# Patient Record
Sex: FEMALE | Race: BLACK OR AFRICAN AMERICAN | NOT HISPANIC OR LATINO | Employment: FULL TIME | URBAN - METROPOLITAN AREA
[De-identification: names, ages, dates, MRNs, and addresses within clinical notes are randomized per-mention and may not be internally consistent; named-entity substitution may affect disease eponyms.]

---

## 2017-01-13 DIAGNOSIS — Z00.00 ROUTINE GENERAL MEDICAL EXAMINATION AT A HEALTH CARE FACILITY: Primary | ICD-10-CM

## 2017-01-13 DIAGNOSIS — R73.03 PREDIABETES: ICD-10-CM

## 2017-02-13 ENCOUNTER — LAB VISIT (OUTPATIENT)
Dept: LAB | Facility: HOSPITAL | Age: 37
End: 2017-02-13
Attending: FAMILY MEDICINE
Payer: COMMERCIAL

## 2017-02-13 DIAGNOSIS — Z00.00 ROUTINE GENERAL MEDICAL EXAMINATION AT A HEALTH CARE FACILITY: ICD-10-CM

## 2017-02-13 DIAGNOSIS — R73.03 PREDIABETES: ICD-10-CM

## 2017-02-13 LAB
ALBUMIN SERPL BCP-MCNC: 3.5 G/DL
ALP SERPL-CCNC: 70 U/L
ALT SERPL W/O P-5'-P-CCNC: 13 U/L
ANION GAP SERPL CALC-SCNC: 9 MMOL/L
AST SERPL-CCNC: 21 U/L
BASOPHILS # BLD AUTO: 0.02 K/UL
BASOPHILS NFR BLD: 0.3 %
BILIRUB SERPL-MCNC: 0.3 MG/DL
BUN SERPL-MCNC: 12 MG/DL
CALCIUM SERPL-MCNC: 9.3 MG/DL
CHLORIDE SERPL-SCNC: 106 MMOL/L
CHOLEST/HDLC SERPL: 2.9 {RATIO}
CO2 SERPL-SCNC: 24 MMOL/L
CREAT SERPL-MCNC: 0.8 MG/DL
DIFFERENTIAL METHOD: ABNORMAL
EOSINOPHIL # BLD AUTO: 0.1 K/UL
EOSINOPHIL NFR BLD: 1.2 %
ERYTHROCYTE [DISTWIDTH] IN BLOOD BY AUTOMATED COUNT: 15 %
EST. GFR  (AFRICAN AMERICAN): >60 ML/MIN/1.73 M^2
EST. GFR  (NON AFRICAN AMERICAN): >60 ML/MIN/1.73 M^2
GLUCOSE SERPL-MCNC: 93 MG/DL
HCT VFR BLD AUTO: 39.5 %
HDL/CHOLESTEROL RATIO: 34 %
HDLC SERPL-MCNC: 159 MG/DL
HDLC SERPL-MCNC: 54 MG/DL
HGB BLD-MCNC: 12.5 G/DL
LDLC SERPL CALC-MCNC: 86 MG/DL
LYMPHOCYTES # BLD AUTO: 1.7 K/UL
LYMPHOCYTES NFR BLD: 25.9 %
MCH RBC QN AUTO: 25 PG
MCHC RBC AUTO-ENTMCNC: 31.6 %
MCV RBC AUTO: 79 FL
MONOCYTES # BLD AUTO: 0.5 K/UL
MONOCYTES NFR BLD: 7.4 %
NEUTROPHILS # BLD AUTO: 4.2 K/UL
NEUTROPHILS NFR BLD: 65 %
NONHDLC SERPL-MCNC: 105 MG/DL
PLATELET # BLD AUTO: 261 K/UL
PMV BLD AUTO: 11.5 FL
POTASSIUM SERPL-SCNC: 4.2 MMOL/L
PROT SERPL-MCNC: 7.9 G/DL
RBC # BLD AUTO: 5 M/UL
SODIUM SERPL-SCNC: 139 MMOL/L
TRIGL SERPL-MCNC: 95 MG/DL
TSH SERPL DL<=0.005 MIU/L-ACNC: 2.27 UIU/ML
WBC # BLD AUTO: 6.48 K/UL

## 2017-02-13 PROCEDURE — 80053 COMPREHEN METABOLIC PANEL: CPT

## 2017-02-13 PROCEDURE — 80061 LIPID PANEL: CPT

## 2017-02-13 PROCEDURE — 84443 ASSAY THYROID STIM HORMONE: CPT

## 2017-02-13 PROCEDURE — 85025 COMPLETE CBC W/AUTO DIFF WBC: CPT

## 2017-02-13 PROCEDURE — 83036 HEMOGLOBIN GLYCOSYLATED A1C: CPT

## 2017-02-13 PROCEDURE — 36415 COLL VENOUS BLD VENIPUNCTURE: CPT | Mod: PO

## 2017-02-14 LAB
ESTIMATED AVG GLUCOSE: 114 MG/DL
HBA1C MFR BLD HPLC: 5.6 %

## 2017-02-15 ENCOUNTER — LAB VISIT (OUTPATIENT)
Dept: LAB | Facility: HOSPITAL | Age: 37
End: 2017-02-15
Attending: FAMILY MEDICINE
Payer: COMMERCIAL

## 2017-02-15 ENCOUNTER — OFFICE VISIT (OUTPATIENT)
Dept: FAMILY MEDICINE | Facility: CLINIC | Age: 37
End: 2017-02-15
Payer: COMMERCIAL

## 2017-02-15 VITALS
WEIGHT: 216.5 LBS | HEART RATE: 72 BPM | HEIGHT: 62 IN | DIASTOLIC BLOOD PRESSURE: 76 MMHG | BODY MASS INDEX: 39.84 KG/M2 | SYSTOLIC BLOOD PRESSURE: 118 MMHG | TEMPERATURE: 99 F

## 2017-02-15 DIAGNOSIS — R53.83 FATIGUE, UNSPECIFIED TYPE: ICD-10-CM

## 2017-02-15 DIAGNOSIS — R73.03 PREDIABETES: ICD-10-CM

## 2017-02-15 DIAGNOSIS — E03.4 HYPOTHYROIDISM DUE TO ACQUIRED ATROPHY OF THYROID: ICD-10-CM

## 2017-02-15 DIAGNOSIS — E66.01 MORBID OBESITY WITH BMI OF 40.0-44.9, ADULT: ICD-10-CM

## 2017-02-15 DIAGNOSIS — L83 ACANTHOSIS NIGRICANS: ICD-10-CM

## 2017-02-15 DIAGNOSIS — Z00.00 ROUTINE GENERAL MEDICAL EXAMINATION AT A HEALTH CARE FACILITY: Primary | ICD-10-CM

## 2017-02-15 DIAGNOSIS — E55.9 VITAMIN D DEFICIENCY DISEASE: ICD-10-CM

## 2017-02-15 DIAGNOSIS — R01.1 HEART MURMUR: ICD-10-CM

## 2017-02-15 LAB
25(OH)D3+25(OH)D2 SERPL-MCNC: 28 NG/ML
CRP SERPL-MCNC: 1.3 MG/L
ERYTHROCYTE [SEDIMENTATION RATE] IN BLOOD BY WESTERGREN METHOD: 34 MM/HR
T3FREE SERPL-MCNC: 3.8 PG/ML
T4 FREE SERPL-MCNC: 0.81 NG/DL

## 2017-02-15 PROCEDURE — 99395 PREV VISIT EST AGE 18-39: CPT | Mod: S$GLB,,, | Performed by: FAMILY MEDICINE

## 2017-02-15 PROCEDURE — 99999 PR PBB SHADOW E&M-EST. PATIENT-LVL III: CPT | Mod: PBBFAC,,, | Performed by: FAMILY MEDICINE

## 2017-02-15 PROCEDURE — 85651 RBC SED RATE NONAUTOMATED: CPT

## 2017-02-15 PROCEDURE — 86140 C-REACTIVE PROTEIN: CPT

## 2017-02-15 PROCEDURE — 82306 VITAMIN D 25 HYDROXY: CPT

## 2017-02-15 PROCEDURE — 84481 FREE ASSAY (FT-3): CPT

## 2017-02-15 PROCEDURE — 36415 COLL VENOUS BLD VENIPUNCTURE: CPT | Mod: PO

## 2017-02-15 PROCEDURE — 84439 ASSAY OF FREE THYROXINE: CPT

## 2017-02-15 NOTE — MR AVS SNAPSHOT
[facility]  [address] [facility], [address]  [facility] [address]  Phone: [phone]  Fax: [phone]                  Katiuska Salazar   2/15/2017 3:00 PM   Office Visit    Description:  Female : 1980   Provider:  Geovanna Miller DO   Department:  [facility]           Reason for Visit     Annual Exam           Diagnoses this Visit        Comments    Routine general medical examination at a health care facility    -  Primary     Fatigue, unspecified type         Hypothyroidism due to acquired atrophy of thyroid         Vitamin D deficiency disease         Acanthosis nigricans         Morbid obesity with BMI of 40.0-44.9, adult         Prediabetes         Heart murmur                To Do List           Goals (5 Years of Data)     None      [facility] On [facility]     [facility] On Call Nurse Care Line -  Assistance  Registered nurses in the [facility] On Call Center provide clinical advisement, health education, appointment booking, and other advisory services.  Call for this free service at [phone].             Medications           Message regarding Medications     Verify the changes and/or additions to your medication regime listed below are the same as discussed with your clinician today.  If any of these changes or additions are incorrect, please notify your healthcare provider.        STOP taking these medications     PRENATAL VITAMINS-IRON-FA ORAL Take 1 tablet by mouth once daily.      docusate sodium (COLACE) 100 MG capsule Take 1 capsule (100 mg total) by mouth 2 (two) times daily as needed for Constipation.    hydrocodone-acetaminophen 5-325mg (NORCO) 5-325 mg per tablet Take 1 tablet by mouth every 6 (six) hours as needed for Pain.    hydrocortisone 2.5 % cream     simethicone (MYLICON) 80 MG chewable tablet Take 1 tablet (80 mg total) by mouth every 6 (six) hours as needed for Flatulence.    naproxen sodium (ANAPROX) 550 MG tablet Take 1 tablet  "(550 mg total) by mouth every 12 (twelve) hours as needed (cramping/mild pain).           Verify that the below list of medications is an accurate representation of the medications you are currently taking.  If none reported, the list may be blank. If incorrect, please contact your healthcare provider. Carry this list with you in case of emergency.           Current Medications     ARMOUR THYROID 240 mg Tab Take 1 tablet by mouth every evening.     ORTHO TRI-CYCLEN, 28, 0.18/0.215/0.25 mg-35 mcg (28) tablet TAKE 1 TABLET BY MOUTH ONCE DAILY.    iron polysaccharides (NIFEREX) 150 mg iron Cap Take 1 capsule (150 mg total) by mouth once daily.           Clinical Reference Information           Your Vitals Were     BP Pulse Temp Height Weight Last Period    118/76 (BP Location: Left arm, Patient Position: Sitting, BP Method: Manual) 72 98.5 °F (36.9 °C) (Oral) 5' 1.5" (1.562 m) 98.2 kg (216 lb 7.9 oz) 01/25/2017    BMI                40.24 kg/m2          Blood Pressure          Most Recent Value    BP  118/76      Allergies as of 2/15/2017     No Known Allergies      Immunizations Administered on Date of Encounter - 2/15/2017     None      Orders Placed During Today's Visit     Future Labs/Procedures Expected by Expires    C-reactive protein  2/15/2017 4/16/2018    Sedimentation rate, manual  2/15/2017 4/16/2018    T3, free  2/15/2017 4/16/2018    T4, free  2/15/2017 4/16/2018    Vitamin D  2/15/2017 4/16/2018    2D Echo w/ Color Flow Doppler  As directed 2/15/2018      Language Assistance Services     ATTENTION: Language assistance services are available, free of charge. Please call [phone].      ATENCIÓN: Si habla español, tiene a dunlap disposición servicios gratuitos de asistencia lingüística. Llame al [phone].     [facility] Ý: N?u b?n nói Ti?ng Vi?t, có các d?ch v? h? tr? ngôn ng? mi?n phí dành cho b?n. G?i s? [phone].         [facility] complies with applicable Federal civil rights laws " and does not discriminate on the basis of race, color, national origin, age, disability, or sex.

## 2017-02-15 NOTE — PROGRESS NOTES
Subjective:       Patient ID: Katiuska Slaazar is a 36 y.o. female.    Chief Complaint: Annual Exam    HPI  Review of Systems   Constitutional: Positive for fatigue (for past month - and worse in past 2 weeks). Negative for appetite change and fever.   HENT: Negative for hearing loss and sore throat.    Eyes: Negative.    Respiratory: Negative for cough, chest tightness, shortness of breath and wheezing.    Cardiovascular: Negative for chest pain, palpitations and leg swelling.   Gastrointestinal: Negative for abdominal pain, blood in stool, constipation, diarrhea, nausea and vomiting.   Endocrine: Negative.    Genitourinary: Negative for difficulty urinating, dysuria, frequency, hematuria, menstrual problem, pelvic pain and urgency.        Moderately heavy but no change from her normal. Last 4 days .   Musculoskeletal: Negative for back pain.        Pain in the heels- especially first thing in the morning or if she has been sitting for a while and first gets up an and walks    Skin: Negative for pallor and rash.   Allergic/Immunologic: Negative.    Neurological: Negative for dizziness, syncope, light-headedness and headaches.   Hematological: Negative for adenopathy.   Psychiatric/Behavioral: Negative.  Negative for dysphoric mood and sleep disturbance.       Objective:      Physical Exam   Constitutional: She is oriented to person, place, and time. She appears well-developed and well-nourished.   HENT:   Head: Normocephalic and atraumatic.   Right Ear: External ear normal.   Left Ear: External ear normal.   Nose: Nose normal.   Mouth/Throat: Oropharynx is clear and moist.   Eyes: Conjunctivae and EOM are normal. Pupils are equal, round, and reactive to light.   Neck: Normal range of motion. Neck supple. No JVD present. No thyromegaly present.   Cardiovascular: Normal rate, regular rhythm, normal heart sounds and intact distal pulses.    No murmur heard.  Pulmonary/Chest: Effort normal and breath sounds  normal.   Abdominal: Soft. Bowel sounds are normal. She exhibits no mass. There is no tenderness.   Musculoskeletal: Normal range of motion. She exhibits no edema.   Lymphadenopathy:     She has no cervical adenopathy.   Neurological: She is alert and oriented to person, place, and time. She has normal reflexes.   Skin: Skin is warm and dry.   Mild acanthosis nigricans   Psychiatric: She has a normal mood and affect. Her behavior is normal. Judgment and thought content normal.   Vitals reviewed.      HEALTH SCREENING  Mammogram na  Pap 7/13 due   BMD- na  Colonoscopy- na  Pneumovax na  prevnar- na  fluvax- recommended  Shingles- na  Tdap-8/15  AD/Living Will- copies provided    Assessment:         Katiuska was seen today for annual exam.    Diagnoses and all orders for this visit:    Routine general medical examination at a health care facility    Fatigue, unspecified type  -     Sedimentation rate, manual; Future  -     C-reactive protein; Future    Hypothyroidism due to acquired atrophy of thyroid  -     T3, free; Future  -     T4, free; Future    Vitamin D deficiency disease  -     Vitamin D; Future    Acanthosis nigricans  Encourage low carb diet and regular exercise    Morbid obesity with BMI of 40.0-44.9, adult  Encourage low carb diet and regular exercise    Prediabetes  Encourage low carb diet and regular exercise    Heart murmur  -     2D Echo w/ Color Flow Doppler; Future  Pt believes this may have something to do with her lifestyle. She is working and doing most of the cleaning and looking after her twins and is not exercising and taking care of herself. She is going to try and change things up a bit but   If echo ok and labs ok but she is not feeling better in a month or two then notify me again

## 2017-02-24 ENCOUNTER — HOSPITAL ENCOUNTER (OUTPATIENT)
Dept: CARDIOLOGY | Facility: HOSPITAL | Age: 37
Discharge: HOME | End: 2017-02-24
Attending: FAMILY MEDICINE
Payer: COMMERCIAL

## 2017-02-24 DIAGNOSIS — R01.1 HEART MURMUR: ICD-10-CM

## 2017-02-24 LAB
DIASTOLIC DYSFUNCTION: NO
ESTIMATED PA SYSTOLIC PRESSURE: 28.07
GLOBAL PERICARDIAL EFFUSION: NORMAL
MITRAL VALVE REGURGITATION: NORMAL
RETIRED EF AND QEF - SEE NOTES: 50 (ref 55–65)
TRICUSPID VALVE REGURGITATION: NORMAL

## 2017-02-24 PROCEDURE — 93306 TTE W/DOPPLER COMPLETE: CPT

## 2017-02-24 PROCEDURE — 93306 TTE W/DOPPLER COMPLETE: CPT | Mod: 26,,, | Performed by: INTERNAL MEDICINE

## 2017-02-25 DIAGNOSIS — R93.1 ABNORMAL ECHOCARDIOGRAM: Primary | ICD-10-CM

## 2017-03-02 ENCOUNTER — LAB VISIT (OUTPATIENT)
Dept: LAB | Facility: HOSPITAL | Age: 37
End: 2017-03-02
Attending: FAMILY MEDICINE
Payer: COMMERCIAL

## 2017-03-02 DIAGNOSIS — Z30.011 ENCOUNTER FOR INITIAL PRESCRIPTION OF CONTRACEPTIVE PILLS: ICD-10-CM

## 2017-03-02 DIAGNOSIS — R93.1 ABNORMAL ECHOCARDIOGRAM: ICD-10-CM

## 2017-03-02 PROBLEM — I34.0 NON-RHEUMATIC MITRAL REGURGITATION: Status: ACTIVE | Noted: 2017-03-02

## 2017-03-02 LAB — BNP SERPL-MCNC: 21 PG/ML

## 2017-03-02 PROCEDURE — 36415 COLL VENOUS BLD VENIPUNCTURE: CPT | Mod: PO

## 2017-03-02 PROCEDURE — 83880 ASSAY OF NATRIURETIC PEPTIDE: CPT

## 2017-03-02 RX ORDER — NORGESTIMATE AND ETHINYL ESTRADIOL 7DAYSX3 28
KIT ORAL
Qty: 28 TABLET | Refills: 1 | Status: SHIPPED | OUTPATIENT
Start: 2017-03-02 | End: 2017-04-27 | Stop reason: SDUPTHER

## 2017-03-14 ENCOUNTER — OFFICE VISIT (OUTPATIENT)
Dept: FAMILY MEDICINE | Facility: CLINIC | Age: 37
End: 2017-03-14
Payer: COMMERCIAL

## 2017-03-14 VITALS
BODY MASS INDEX: 40.63 KG/M2 | TEMPERATURE: 98 F | HEART RATE: 76 BPM | WEIGHT: 215.19 LBS | HEIGHT: 61 IN | DIASTOLIC BLOOD PRESSURE: 70 MMHG | SYSTOLIC BLOOD PRESSURE: 118 MMHG

## 2017-03-14 DIAGNOSIS — I34.0 NON-RHEUMATIC MITRAL REGURGITATION: ICD-10-CM

## 2017-03-14 DIAGNOSIS — H01.139 EYELID DERMATITIS, ECZEMATOUS, UNSPECIFIED LATERALITY: ICD-10-CM

## 2017-03-14 DIAGNOSIS — E55.9 VITAMIN D DEFICIENCY: ICD-10-CM

## 2017-03-14 DIAGNOSIS — E61.1 IRON DEFICIENCY: Primary | ICD-10-CM

## 2017-03-14 DIAGNOSIS — R73.03 PREDIABETES: ICD-10-CM

## 2017-03-14 DIAGNOSIS — E03.4 HYPOTHYROIDISM DUE TO ACQUIRED ATROPHY OF THYROID: ICD-10-CM

## 2017-03-14 DIAGNOSIS — E66.01 MORBID OBESITY WITH BMI OF 40.0-44.9, ADULT: ICD-10-CM

## 2017-03-14 DIAGNOSIS — L83 ACANTHOSIS NIGRICANS: ICD-10-CM

## 2017-03-14 PROCEDURE — 99214 OFFICE O/P EST MOD 30 MIN: CPT | Mod: S$GLB,,, | Performed by: FAMILY MEDICINE

## 2017-03-14 PROCEDURE — 1160F RVW MEDS BY RX/DR IN RCRD: CPT | Mod: S$GLB,,, | Performed by: FAMILY MEDICINE

## 2017-03-14 PROCEDURE — 99999 PR PBB SHADOW E&M-EST. PATIENT-LVL III: CPT | Mod: PBBFAC,,, | Performed by: FAMILY MEDICINE

## 2017-03-14 RX ORDER — TACROLIMUS 1 MG/G
OINTMENT TOPICAL 2 TIMES DAILY
Qty: 30 G | Refills: 1 | Status: SHIPPED | OUTPATIENT
Start: 2017-03-14 | End: 2017-04-28

## 2017-03-14 NOTE — MR AVS SNAPSHOT
[facility]  [address] [facility], [address]  [facility] [address]  Phone: [phone]  Fax: [phone]                  Katiuska Salazar   3/14/2017 11:00 AM   Office Visit    Description:  Female : 1980   Provider:  Geovanna Miller DO   Department:  [facility]           Reason for Visit     Heart Murmur           Diagnoses this Visit        Comments    Iron deficiency    -  Primary     Vitamin D deficiency         Eyelid dermatitis, eczematous, unspecified laterality                To Do List           Future Appointments        Provider Department Dept Phone    2017 9:45 AM Tanya Welch MD [facility] [place] - OB/GYN [address] [phone]      Goals (5 Years of Data)     None       These Medications        Disp Refills Start End    tacrolimus (PROTOPIC) 0.1 % ointment 30 g 1 3/14/2017     Apply topically 2 (two) times daily. - Topical (Top)    Pharmacy: [facility]/pharmacy #[id number] - [place] - [address] Ph #: [phone]         [facility] On Call     [facility] On Call Nurse Care Line -  Assistance  Registered nurses in the [facility] On Call Center provide clinical advisement, health education, appointment booking, and other advisory services.  Call for this free service at [phone].             Medications           Message regarding Medications     Verify the changes and/or additions to your medication regime listed below are the same as discussed with your clinician today.  If any of these changes or additions are incorrect, please notify your healthcare provider.        START taking these NEW medications        Refills    tacrolimus (PROTOPIC) 0.1 % ointment 1    Sig: Apply topically 2 (two) times daily.    Class: Normal    Route: Topical (Top)           Verify that the below list of medications is an accurate representation of the medications you are currently taking.  If none reported, the list may be blank. If incorrect, please  "contact your healthcare provider. Carry this list with you in case of emergency.           Current Medications     ARMOUR THYROID 240 mg Tab Take 1 tablet by mouth every evening.     ORTHO TRI-CYCLEN, 28, 0.18/0.215/0.25 mg-35 mcg (28) tablet TAKE 1 TABLET BY MOUTH ONCE DAILY.    iron polysaccharides (NIFEREX) 150 mg iron Cap Take 1 capsule (150 mg total) by mouth once daily.    tacrolimus (PROTOPIC) 0.1 % ointment Apply topically 2 (two) times daily.           Clinical Reference Information           Your Vitals Were     BP Pulse Temp Height    118/70 (BP Location: Left arm, Patient Position: Sitting, BP Method: Manual) 76 97.9 °F (36.6 °C) (Oral) 5' 1" (1.549 m)    Weight Last Period BMI    97.6 kg (215 lb 2.7 oz) 02/28/2017 (Approximate) 40.66 kg/m2      Blood Pressure          Most Recent Value    BP  118/70      Allergies as of 3/14/2017     No Known Allergies      Immunizations Administered on Date of Encounter - 3/14/2017     None      Orders Placed During Today's Visit     Future Labs/Procedures Expected by Expires    CBC auto differential  3/14/2017 5/13/2018    Iron and TIBC  3/14/2017 3/14/2018    Vitamin D  3/14/2017 5/13/2018      Language Assistance Services     ATTENTION: Language assistance services are available, free of charge. Please call [phone].      ATENCIÓN: Si habla español, tiene a dunlap disposición servicios gratuitos de asistencia lingüística. Llame al [phone].     [facility] Ý: N?u b?n nói Ti?ng Vi?t, có các d?ch v? h? tr? ngôn ng? mi?n phí dành cho b?n. G?i s? [phone].         [facility] complies with applicable Federal civil rights laws and does not discriminate on the basis of race, color, national origin, age, disability, or sex.        "

## 2017-03-14 NOTE — PROGRESS NOTES
Subjective:       Patient ID: Katiuska Salazar is a 36 y.o. female.    Chief Complaint: Heart Murmur (follow up)    HPI she is feeling a bit better overall. She went to the remedy room and had some treatments of high dose vitamin D and is feeling  Better.   50,000 IU shot then a pack of Vit d . So 3 times a week she takes 50,000 units orally for 3 weeks,   Review of Systems  scaling and swelling around her eyelid margins s since xmas- she saw an dermatologist   Objective:      Physical Exam   Constitutional: She is oriented to person, place, and time. She appears well-developed and well-nourished.   HENT:   Head: Normocephalic and atraumatic.   Right Ear: External ear normal.   Left Ear: External ear normal.   Nose: Nose normal.   Mouth/Throat: Oropharynx is clear and moist.   Eyes: Conjunctivae and EOM are normal. Pupils are equal, round, and reactive to light.   Neck: Normal range of motion. Neck supple. No JVD present. No thyromegaly present.   Cardiovascular: Normal rate, regular rhythm, normal heart sounds and intact distal pulses.    No murmur heard.  Pulmonary/Chest: Effort normal and breath sounds normal.   Abdominal: She exhibits no mass. There is no tenderness.   Musculoskeletal: Normal range of motion. She exhibits no edema.   Lymphadenopathy:     She has no cervical adenopathy.   Neurological: She is alert and oriented to person, place, and time. She has normal reflexes.   Skin: Skin is warm and dry. There is erythema (dry and red eyelids).   Psychiatric: She has a normal mood and affect. Her behavior is normal. Judgment and thought content normal.   Vitals reviewed.      Assessment:         Katiuska was seen today for heart murmur.    Diagnoses and all orders for this visit:    Iron deficiency  -     CBC auto differential; Future  -     Iron and TIBC; Future    Vitamin D deficiency  -     Vitamin D; Future    Eyelid dermatitis, eczematous, unspecified laterality  -     tacrolimus (PROTOPIC) 0.1 %  ointment; Apply topically 2 (two) times daily.    Prediabetes  Encourage low carb diet and regular exercise    Morbid obesity with BMI of 40.0-44.9, adult  Encourage low carb diet and regular exercise    Hypothyroidism due to acquired atrophy of thyroid  Continuing current management.  Acanthosis nigricans  Encourage low carb diet and regular exercise    Non-rheumatic mitral regurgitation  Continue diet and exercise and we will repeat us in 1 year

## 2017-04-27 DIAGNOSIS — Z30.011 ENCOUNTER FOR INITIAL PRESCRIPTION OF CONTRACEPTIVE PILLS: ICD-10-CM

## 2017-04-27 RX ORDER — NORGESTIMATE AND ETHINYL ESTRADIOL 7DAYSX3 28
KIT ORAL
Qty: 28 TABLET | Refills: 1 | Status: SHIPPED | OUTPATIENT
Start: 2017-04-27 | End: 2017-04-28 | Stop reason: SDUPTHER

## 2017-04-28 ENCOUNTER — OFFICE VISIT (OUTPATIENT)
Dept: OBSTETRICS AND GYNECOLOGY | Facility: CLINIC | Age: 37
End: 2017-04-28
Attending: OBSTETRICS & GYNECOLOGY
Payer: COMMERCIAL

## 2017-04-28 VITALS
DIASTOLIC BLOOD PRESSURE: 70 MMHG | HEIGHT: 61 IN | BODY MASS INDEX: 40.58 KG/M2 | SYSTOLIC BLOOD PRESSURE: 116 MMHG | WEIGHT: 214.94 LBS

## 2017-04-28 DIAGNOSIS — Z01.419 VISIT FOR GYNECOLOGIC EXAMINATION: Primary | ICD-10-CM

## 2017-04-28 DIAGNOSIS — Z30.011 ENCOUNTER FOR INITIAL PRESCRIPTION OF CONTRACEPTIVE PILLS: ICD-10-CM

## 2017-04-28 PROCEDURE — 88175 CYTOPATH C/V AUTO FLUID REDO: CPT

## 2017-04-28 PROCEDURE — 99395 PREV VISIT EST AGE 18-39: CPT | Mod: S$GLB,,, | Performed by: OBSTETRICS & GYNECOLOGY

## 2017-04-28 PROCEDURE — 99999 PR PBB SHADOW E&M-EST. PATIENT-LVL II: CPT | Mod: PBBFAC,,, | Performed by: OBSTETRICS & GYNECOLOGY

## 2017-04-28 PROCEDURE — 87624 HPV HI-RISK TYP POOLED RSLT: CPT

## 2017-04-28 RX ORDER — NORGESTIMATE AND ETHINYL ESTRADIOL 7DAYSX3 28
1 KIT ORAL DAILY
Qty: 28 TABLET | Refills: 12 | Status: SHIPPED | OUTPATIENT
Start: 2017-04-28 | End: 2018-05-20 | Stop reason: SDUPTHER

## 2017-04-28 NOTE — PROGRESS NOTES
"CC: Well woman exam    Katiuska Salazar is a 36 y.o. female  presents for a well woman exam.  She has no issues, problems, or complaints.    Past Medical History:   Diagnosis Date    Abnormal Pap smear 5 yrs ago    repap    Anemia     History of blood transfusion     Thyroid disease     /hx of GILLILAND tx at age 22       Past Surgical History:   Procedure Laterality Date     SECTION  2015       OB History    Para Term  AB SAB TAB Ectopic Multiple Living   2 1 1  1  1  1 2      # Outcome Date GA Lbr Regan/2nd Weight Sex Delivery Anes PTL Lv   2A Term 09/25/15 37w1d  2.03 kg (4 lb 7.6 oz) F CS-LTranv Spinal N Y   2B Term 09/25/15 37w1d  2.915 kg (6 lb 6.8 oz) M CS-LTranv Spinal N Y   1 TAB                   Family History   Problem Relation Age of Onset    Thyroid disease Mother     Hypertension Mother     No Known Problems Father     No Known Problems Brother     No Known Problems Brother     Lung cancer Maternal Grandmother     Pancreatic cancer Maternal Aunt     No Known Problems Son     No Known Problems Daughter     Sayre legs Neg Hx     Breast cancer Neg Hx     Ovarian cancer Neg Hx     Colon cancer Neg Hx        Social History   Substance Use Topics    Smoking status: Never Smoker    Smokeless tobacco: Never Used    Alcohol use Yes      Comment: socially 0-3 per week       /70  Ht 5' 1" (1.549 m)  Wt 97.5 kg (214 lb 15.2 oz)  LMP 2017 (Exact Date)  BMI 40.61 kg/m2    ROS:  GENERAL: Denies weight gain or weight loss. Feeling well overall.   SKIN: Denies rash or lesions.   HEAD: Denies head injury or headache.   NODES: Denies enlarged lymph nodes.   CHEST: Denies chest pain or shortness of breath.   CARDIOVASCULAR: Denies palpitations or left sided chest pain.   ABDOMEN: No abdominal pain, constipation, diarrhea, nausea, vomiting or rectal bleeding.   URINARY: No frequency, dysuria, hematuria, or burning on urination.  REPRODUCTIVE: See HPI. "   BREASTS: The patient performs breast self-examination and denies pain, lumps, or nipple discharge.   HEMATOLOGIC: No easy bruisability or excessive bleeding.  MUSCULOSKELETAL: Denies joint pain or swelling.   NEUROLOGIC: Denies syncope or weakness.   PSYCHIATRIC: Denies depression, anxiety or mood swings.    Physical Exam:    APPEARANCE: Well nourished, well developed, in no acute distress.  AFFECT: WNL, alert and oriented x 3  SKIN: No acne or hirsutism  NECK: Neck symmetric without masses or thyromegaly  NODES: No inguinal, cervical, axillary, or femoral lymph node enlargement  CHEST: Good respiratory effect  ABDOMEN: Soft.  No tenderness or masses.  No hepatosplenomegaly.  No hernias.  BREASTS: Symmetrical, no skin changes or visible lesions.  No palpable masses, nipple discharge bilaterally.  PELVIC: Normal external genitalia without lesions.  Normal hair distribution.  Adequate perineal body, normal urethral meatus.  Vagina moist and well rugated without lesions or discharge.  Cervix pink, without lesions, discharge or tenderness.  No significant cystocele or rectocele.  Bimanual exam shows uterus to be normal size, regular, mobile and nontender.  Adnexa without masses or tenderness.    EXTREMITIES: No edema.    ASSESSMENT AND PLAN  1. Visit for gynecologic examination  Liquid-based pap smear, screening    HPV High Risk Genotypes, PCR   2. Encounter for initial prescription of contraceptive pills  ORTHO TRI-CYCLEN, 28, 0.18/0.215/0.25 mg-35 mcg (28) tablet       Patient was counseled today on A.C.S. Pap guidelines and recommendations for yearly pelvic exams, pap smears every 5 years with HPV co-testing, mammograms and monthly self breast exams; to see her PCP for other health maintenance.     Return in about 1 year (around 4/28/2018).

## 2017-05-03 LAB
HPV16 DNA SPEC QL NAA+PROBE: NEGATIVE
HPV16+18+H RISK 12 DNA CVX-IMP: NEGATIVE
HPV18 DNA SPEC QL NAA+PROBE: NEGATIVE

## 2017-09-13 ENCOUNTER — TELEPHONE (OUTPATIENT)
Dept: FAMILY MEDICINE | Facility: CLINIC | Age: 37
End: 2017-09-13

## 2017-09-13 RX ORDER — BENZONATATE 200 MG/1
200 CAPSULE ORAL 3 TIMES DAILY PRN
Qty: 30 CAPSULE | Refills: 0 | Status: SHIPPED | OUTPATIENT
Start: 2017-09-13 | End: 2018-02-27 | Stop reason: SDUPTHER

## 2017-09-13 NOTE — TELEPHONE ENCOUNTER
"----- Message from Toshia Grant sent at 9/13/2017  8:38 AM CDT -----  Contact: [phone] Pt  Pt Katiuska Harrison called to see if she could get a prescription for "pearls" for her cough.  Her bronchitis is acting up and she needs to get a refill.  Pt would like the prescription to be called into [facility] in [place].  Pt can be reached @ [phone]  "

## 2017-09-13 NOTE — TELEPHONE ENCOUNTER
EPP 2/15/17; f/u 3/14/17.  Requesting a rx for Tessalon perles to soothe the cough from her bronchitis.

## 2018-02-26 DIAGNOSIS — Z00.00 ANNUAL PHYSICAL EXAM: Primary | ICD-10-CM

## 2018-02-26 DIAGNOSIS — R73.03 PREDIABETES: ICD-10-CM

## 2018-02-26 NOTE — TELEPHONE ENCOUNTER
"----- Message from Princess Enrique sent at 2/26/2018  1:40 PM CST -----  Contact: self/[phone]  RX request - refill or new RX.  Is this a refill or new RX:  refill  RX name and strength: ARMOUR THYROID 240 mg Tab  Directions: Take 1 tablet by mouth every evening  Is this a 30 day or 90 day RX:    Pharmacy name and phone # (DON'T enter "on file" or "in chart"): [facility] [phone] (Phone)  [phone] (Fax)  Comments:        "

## 2018-02-27 RX ORDER — BENZONATATE 200 MG/1
200 CAPSULE ORAL 3 TIMES DAILY PRN
Qty: 30 CAPSULE | Refills: 1 | Status: SHIPPED | OUTPATIENT
Start: 2018-02-27 | End: 2018-03-09

## 2018-05-20 DIAGNOSIS — Z30.011 ENCOUNTER FOR INITIAL PRESCRIPTION OF CONTRACEPTIVE PILLS: ICD-10-CM

## 2018-05-21 RX ORDER — NORGESTIMATE AND ETHINYL ESTRADIOL 7DAYSX3 28
1 KIT ORAL DAILY
Qty: 28 TABLET | Refills: 11 | Status: SHIPPED | OUTPATIENT
Start: 2018-05-21 | End: 2019-05-12 | Stop reason: SDUPTHER

## 2018-05-26 DIAGNOSIS — E66.01 MORBID OBESITY WITH BMI OF 40.0-44.9, ADULT: ICD-10-CM

## 2018-05-26 DIAGNOSIS — E03.4 HYPOTHYROIDISM DUE TO ACQUIRED ATROPHY OF THYROID: Primary | ICD-10-CM

## 2018-05-26 DIAGNOSIS — R73.03 PREDIABETES: ICD-10-CM

## 2018-05-26 DIAGNOSIS — Z00.00 GENERAL MEDICAL EXAM: ICD-10-CM

## 2018-05-27 RX ORDER — THYROID, PORCINE 240 MG/1
1 TABLET ORAL NIGHTLY
Qty: 30 TABLET | Refills: 2 | Status: SHIPPED | OUTPATIENT
Start: 2018-05-27 | End: 2018-07-27

## 2018-05-28 ENCOUNTER — PATIENT MESSAGE (OUTPATIENT)
Dept: FAMILY MEDICINE | Facility: CLINIC | Age: 38
End: 2018-05-28

## 2018-07-20 ENCOUNTER — LAB VISIT (OUTPATIENT)
Dept: LAB | Facility: HOSPITAL | Age: 38
End: 2018-07-20
Attending: FAMILY MEDICINE
Payer: COMMERCIAL

## 2018-07-20 DIAGNOSIS — Z00.00 GENERAL MEDICAL EXAM: ICD-10-CM

## 2018-07-20 DIAGNOSIS — E66.01 MORBID OBESITY WITH BMI OF 40.0-44.9, ADULT: ICD-10-CM

## 2018-07-20 DIAGNOSIS — R73.03 PREDIABETES: ICD-10-CM

## 2018-07-20 DIAGNOSIS — E03.4 HYPOTHYROIDISM DUE TO ACQUIRED ATROPHY OF THYROID: ICD-10-CM

## 2018-07-20 LAB
ALBUMIN SERPL BCP-MCNC: 3.5 G/DL
ALP SERPL-CCNC: 60 U/L
ALT SERPL W/O P-5'-P-CCNC: 13 U/L
ANION GAP SERPL CALC-SCNC: 8 MMOL/L
AST SERPL-CCNC: 18 U/L
BASOPHILS # BLD AUTO: 0.04 K/UL
BASOPHILS NFR BLD: 0.4 %
BILIRUB SERPL-MCNC: 0.4 MG/DL
BUN SERPL-MCNC: 12 MG/DL
CALCIUM SERPL-MCNC: 9.3 MG/DL
CHLORIDE SERPL-SCNC: 105 MMOL/L
CHOLEST SERPL-MCNC: 138 MG/DL
CHOLEST/HDLC SERPL: 2.9 {RATIO}
CO2 SERPL-SCNC: 25 MMOL/L
CREAT SERPL-MCNC: 0.8 MG/DL
DIFFERENTIAL METHOD: ABNORMAL
EOSINOPHIL # BLD AUTO: 0.1 K/UL
EOSINOPHIL NFR BLD: 0.7 %
ERYTHROCYTE [DISTWIDTH] IN BLOOD BY AUTOMATED COUNT: 16.8 %
EST. GFR  (AFRICAN AMERICAN): >60 ML/MIN/1.73 M^2
EST. GFR  (NON AFRICAN AMERICAN): >60 ML/MIN/1.73 M^2
ESTIMATED AVG GLUCOSE: 120 MG/DL
GLUCOSE SERPL-MCNC: 94 MG/DL
HBA1C MFR BLD HPLC: 5.8 %
HCT VFR BLD AUTO: 36.9 %
HDLC SERPL-MCNC: 48 MG/DL
HDLC SERPL: 34.8 %
HGB BLD-MCNC: 11 G/DL
IMM GRANULOCYTES # BLD AUTO: 0.03 K/UL
IMM GRANULOCYTES NFR BLD AUTO: 0.3 %
LDLC SERPL CALC-MCNC: 74.2 MG/DL
LYMPHOCYTES # BLD AUTO: 1.7 K/UL
LYMPHOCYTES NFR BLD: 17.8 %
MCH RBC QN AUTO: 22.2 PG
MCHC RBC AUTO-ENTMCNC: 29.8 G/DL
MCV RBC AUTO: 75 FL
MONOCYTES # BLD AUTO: 0.6 K/UL
MONOCYTES NFR BLD: 6 %
NEUTROPHILS # BLD AUTO: 7 K/UL
NEUTROPHILS NFR BLD: 74.8 %
NONHDLC SERPL-MCNC: 90 MG/DL
NRBC BLD-RTO: 0 /100 WBC
PLATELET # BLD AUTO: 270 K/UL
PMV BLD AUTO: 11.6 FL
POTASSIUM SERPL-SCNC: 4.4 MMOL/L
PROT SERPL-MCNC: 7.8 G/DL
RBC # BLD AUTO: 4.95 M/UL
SODIUM SERPL-SCNC: 138 MMOL/L
T4 FREE SERPL-MCNC: 1.12 NG/DL
TRIGL SERPL-MCNC: 79 MG/DL
TSH SERPL DL<=0.005 MIU/L-ACNC: 0.21 UIU/ML
WBC # BLD AUTO: 9.43 K/UL

## 2018-07-20 PROCEDURE — 84439 ASSAY OF FREE THYROXINE: CPT

## 2018-07-20 PROCEDURE — 80053 COMPREHEN METABOLIC PANEL: CPT

## 2018-07-20 PROCEDURE — 85025 COMPLETE CBC W/AUTO DIFF WBC: CPT

## 2018-07-20 PROCEDURE — 36415 COLL VENOUS BLD VENIPUNCTURE: CPT | Mod: PO

## 2018-07-20 PROCEDURE — 83036 HEMOGLOBIN GLYCOSYLATED A1C: CPT

## 2018-07-20 PROCEDURE — 80061 LIPID PANEL: CPT

## 2018-07-20 PROCEDURE — 84443 ASSAY THYROID STIM HORMONE: CPT

## 2018-07-27 ENCOUNTER — CLINICAL SUPPORT (OUTPATIENT)
Dept: CARDIOLOGY | Facility: CLINIC | Age: 38
End: 2018-07-27
Attending: FAMILY MEDICINE
Payer: COMMERCIAL

## 2018-07-27 ENCOUNTER — OFFICE VISIT (OUTPATIENT)
Dept: FAMILY MEDICINE | Facility: CLINIC | Age: 38
End: 2018-07-27
Payer: COMMERCIAL

## 2018-07-27 VITALS
DIASTOLIC BLOOD PRESSURE: 62 MMHG | HEIGHT: 61 IN | WEIGHT: 212.31 LBS | SYSTOLIC BLOOD PRESSURE: 110 MMHG | BODY MASS INDEX: 40.08 KG/M2 | TEMPERATURE: 99 F | HEART RATE: 98 BPM

## 2018-07-27 DIAGNOSIS — G47.33 OSA (OBSTRUCTIVE SLEEP APNEA): ICD-10-CM

## 2018-07-27 DIAGNOSIS — E03.4 HYPOTHYROIDISM DUE TO ACQUIRED ATROPHY OF THYROID: ICD-10-CM

## 2018-07-27 DIAGNOSIS — L83 ACANTHOSIS NIGRICANS: ICD-10-CM

## 2018-07-27 DIAGNOSIS — E66.01 MORBID OBESITY WITH BMI OF 40.0-44.9, ADULT: ICD-10-CM

## 2018-07-27 DIAGNOSIS — I34.0 NON-RHEUMATIC MITRAL REGURGITATION: ICD-10-CM

## 2018-07-27 DIAGNOSIS — R73.03 PREDIABETES: ICD-10-CM

## 2018-07-27 DIAGNOSIS — Z00.00 ROUTINE GENERAL MEDICAL EXAMINATION AT A HEALTH CARE FACILITY: Primary | ICD-10-CM

## 2018-07-27 LAB
ALBUMIN/CREAT UR: 4.3 UG/MG
CREAT UR-MCNC: 161 MG/DL
DIASTOLIC DYSFUNCTION: NO
ESTIMATED PA SYSTOLIC PRESSURE: 24.16
MICROALBUMIN UR DL<=1MG/L-MCNC: 7 UG/ML
MITRAL VALVE MOBILITY: NORMAL
MITRAL VALVE REGURGITATION: NORMAL
RETIRED EF AND QEF - SEE NOTES: 55 (ref 55–65)
TRICUSPID VALVE REGURGITATION: NORMAL

## 2018-07-27 PROCEDURE — 93306 TTE W/DOPPLER COMPLETE: CPT | Mod: S$GLB,,, | Performed by: INTERNAL MEDICINE

## 2018-07-27 PROCEDURE — 82043 UR ALBUMIN QUANTITATIVE: CPT

## 2018-07-27 PROCEDURE — 99395 PREV VISIT EST AGE 18-39: CPT | Mod: S$GLB,,, | Performed by: FAMILY MEDICINE

## 2018-07-27 PROCEDURE — 99999 PR PBB SHADOW E&M-EST. PATIENT-LVL III: CPT | Mod: PBBFAC,,, | Performed by: FAMILY MEDICINE

## 2018-07-27 NOTE — PROGRESS NOTES
Subjective:     Patient ID: Katiuska Salazar is a 38 y.o. female.    Chief Complaint: Annual Exam    HPI  Review of Systems   Constitutional: Positive for fatigue. Negative for appetite change and fever.   HENT: Negative for hearing loss and sore throat.    Eyes: Negative.    Respiratory: Negative for cough, chest tightness, shortness of breath and wheezing.    Cardiovascular: Negative for chest pain, palpitations and leg swelling.   Gastrointestinal: Negative for abdominal pain, blood in stool, constipation, diarrhea, nausea and vomiting.   Endocrine: Negative.    Genitourinary: Negative for difficulty urinating, dysuria, frequency, hematuria, menstrual problem, pelvic pain and urgency.   Musculoskeletal: Negative for back pain.   Skin: Negative for pallor and rash.   Allergic/Immunologic: Negative.    Neurological: Negative for dizziness, syncope, light-headedness and headaches.   Hematological: Negative for adenopathy.   Psychiatric/Behavioral: Negative.  Negative for dysphoric mood and sleep disturbance.       Objective:      Physical Exam   Constitutional: She is oriented to person, place, and time. She appears well-developed and well-nourished.   HENT:   Head: Normocephalic and atraumatic.   Right Ear: External ear normal.   Left Ear: External ear normal.   Nose: Nose normal.   Crowded airway   Eyes: Conjunctivae and EOM are normal. Pupils are equal, round, and reactive to light.   Neck: Normal range of motion. Neck supple. No JVD present. No thyromegaly present.   Cardiovascular: Normal rate, regular rhythm, normal heart sounds and intact distal pulses.    No murmur heard.  Pulmonary/Chest: Effort normal and breath sounds normal.   Abdominal: Soft. Bowel sounds are normal. She exhibits no mass. There is no tenderness.   Musculoskeletal: Normal range of motion. She exhibits no edema.   Lymphadenopathy:     She has no cervical adenopathy.   Neurological: She is alert and oriented to person, place, and  time. She has normal reflexes.   Skin: Skin is warm and dry.   Psychiatric: She has a normal mood and affect. Her behavior is normal. Judgment and thought content normal.   Vitals reviewed.      Assessment:     Katiuska was seen today for annual exam.    Diagnoses and all orders for this visit:    Routine general medical examination at a health care facility    Morbid obesity with BMI of 40.0-44.9, adult    Prediabetes  -     Microalbumin/creatinine urine ratio  rtc in 6 months for HgA1c prior    Hypothyroidism due to acquired atrophy of thyroid  -     thyroid, pork, (ARMOUR THYROID) 180 mg Tab; Take 1 tablet by mouth once daily.    Non-rheumatic mitral regurgitation  -     2D Echo w/ Color Flow Doppler; Future    Acanthosis nigricans    ARTHUR (obstructive sleep apnea)  -     Home Sleep Studies; Future    Obstructive sleep apnea (ARTHUR),    with persistent symptoms of disruptive snoring, history of witnessed apneic pauses, un-refreshing frequent disrupted sleep, morning headaches,  and excessive daytime sleepiness, with exam findings of a crowded oral airway, with medical comorbidities of ,pre DM2.

## 2018-08-14 ENCOUNTER — TELEPHONE (OUTPATIENT)
Dept: SLEEP MEDICINE | Facility: HOSPITAL | Age: 38
End: 2018-08-14

## 2019-03-20 ENCOUNTER — OFFICE VISIT (OUTPATIENT)
Dept: FAMILY MEDICINE | Facility: CLINIC | Age: 39
End: 2019-03-20
Payer: COMMERCIAL

## 2019-03-20 VITALS
DIASTOLIC BLOOD PRESSURE: 68 MMHG | TEMPERATURE: 99 F | BODY MASS INDEX: 40.13 KG/M2 | WEIGHT: 218.06 LBS | SYSTOLIC BLOOD PRESSURE: 106 MMHG | HEIGHT: 62 IN | HEART RATE: 85 BPM

## 2019-03-20 DIAGNOSIS — E89.0 POSTABLATIVE HYPOTHYROIDISM: ICD-10-CM

## 2019-03-20 DIAGNOSIS — Z00.00 ROUTINE GENERAL MEDICAL EXAMINATION AT A HEALTH CARE FACILITY: Primary | ICD-10-CM

## 2019-03-20 PROCEDURE — 99999 PR PBB SHADOW E&M-EST. PATIENT-LVL III: ICD-10-PCS | Mod: PBBFAC,,, | Performed by: FAMILY MEDICINE

## 2019-03-20 PROCEDURE — 99395 PREV VISIT EST AGE 18-39: CPT | Mod: S$GLB,,, | Performed by: FAMILY MEDICINE

## 2019-03-20 PROCEDURE — 99999 PR PBB SHADOW E&M-EST. PATIENT-LVL III: CPT | Mod: PBBFAC,,, | Performed by: FAMILY MEDICINE

## 2019-03-20 PROCEDURE — 99395 PR PREVENTIVE VISIT,EST,18-39: ICD-10-PCS | Mod: S$GLB,,, | Performed by: FAMILY MEDICINE

## 2019-03-20 NOTE — PATIENT INSTRUCTIONS
Send me results of labs from Dr Magnolia Marin, who is following thyroid levels    FOR PRE-DIABETES, YOUR #1 MEDICATION IS EXERCISE --  ===============================================    Try to walk for a continuous 20 minutes every day - in your house or outside (huffing & puffing burns calories & strengthens your heart).     Be aware of everything you eat. Read labels.  Try writing it down so you can see where sugars & carbohydrates are creeping into your foods (drinks other than water, salad dressing, snacks)    Remember, all white bread, white flour (used in baking)  white pasta, white rice, white potatoes, chips, crackers, cookies, sweets, sodas (even Gatorade, Powerade,Koolaid) , sugary coffee & tea,  desserts ----TURN INTO SUGAR.     ===============    Follow with GYN for female health & cancer prevention    ==============================  RECOMMENDATIONS FOR FEMALES  ==============================  Your #1 MEDICINE is DAILY EXERCISE - 15-20 minutes of huffing & puffing EVERY DAY.     Prevent the #1 cause of death- cardiovascular disease (HEART ATTACK & STROKE) by checking for normal blood pressure, cholesterol, sugars, & by not smoking.     VACCINES: Yearly FLU shot    I recommend  high fiber (5 fresh fruits or vegetables daily), low fat diet and aerobic  exercise (huffing/ puffing/ sweating for 20 min straight at least 4 days a week)    Follow up yearly with mammogram, fasting lipids, CMP, CBC prior.   ==============================================================

## 2019-03-20 NOTE — PROGRESS NOTES
Subjective:      Patient ID: Katiuska Salazar is a 38 y.o. female.    Chief Complaint: Establish Care    Here today for general exam.     She follows with holistic medicine Dr Magnolia Gilliland for her thyroid. Usually takes 180mcg, but with TSH low, she switched to 120.  Also at the time, she was using ketogenic diet.  She has follow-up with her in 4 weeks.  She says she had complete blood work with this doctor and will send me results    She states her glucose was elevated in the past. Family hx DM    She has difficulty losing weight and is not as active as she used to be with her 3-year-old twins.    Denies chest pain shortness of breath, nausea vomiting constipation diarrhea  Current Outpatient Medications on File Prior to Visit   Medication Sig    ORTHO TRI-CYCLEN, 28, 0.18/0.215/0.25 mg-35 mcg (28) tablet TAKE 1 TABLET BY MOUTH ONCE DAILY.    thyroid, pork, (ARMOUR THYROID) 180 mg Tab Take 1 tablet by mouth once daily.     No current facility-administered medications on file prior to visit.      Past Medical History:   Diagnosis Date    Abnormal Pap smear 5 yrs ago    repap    Anemia     History of blood transfusion     Thyroid disease     hx of GILLILAND tx at age 22, tx Dr Magnolia Gilliland     Past Surgical History:   Procedure Laterality Date     SECTION  2015    DELIVERY-CEASAREAN SECTION N/A 2015    Performed by Tanya Welch MD at [facility] L[facility]     Social History     Social History Narrative     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, , twins 1boy and 1 girl ages 3 1/3 yo, trying to get regular exercise, pilates/yoga , GYN Sancho     Family History   Problem Relation Age of Onset    Thyroid disease Mother     Hypertension Mother     No Known Problems Father     No Known Problems Brother     No Known Problems Brother     Lung cancer Maternal Grandmother     Pancreatic cancer Maternal Aunt     No Known Problems Son     No Known Problems Daughter     [place] legs Neg Hx   "   Breast cancer Neg Hx     Ovarian cancer Neg Hx     Colon cancer Neg Hx      Vitals:    03/20/19 1042   BP: 106/68   Pulse: 85   Temp: 98.8 °F (37.1 °C)   TempSrc: Oral   Weight: 98.9 kg (218 lb 0.6 oz)   Height: 5' 2" (1.575 m)   PainSc: 0-No pain     Objective:   Physical Exam   Constitutional: She is oriented to person, place, and time. She appears well-developed and well-nourished.   HENT:   Head: Normocephalic and atraumatic.   Right Ear: External ear normal.   Left Ear: External ear normal.   Nose: Nose normal.   Mouth/Throat: Oropharynx is clear and moist.   Eyes: EOM are normal. Pupils are equal, round, and reactive to light.   Neck: Neck supple. No thyromegaly present.   Cardiovascular: Normal rate, regular rhythm, normal heart sounds and intact distal pulses.   No murmur heard.  Pulmonary/Chest: Effort normal and breath sounds normal. She has no wheezes.   Abdominal: Soft. Bowel sounds are normal. She exhibits no distension and no mass. There is no tenderness. There is no rebound and no guarding.   Musculoskeletal: She exhibits no edema.   Lymphadenopathy:     She has no cervical adenopathy.   Neurological: She is alert and oriented to person, place, and time.   Skin: Skin is warm and dry.   Psychiatric: She has a normal mood and affect. Her behavior is normal.     Assessment:     1. Routine general medical examination at a health care facility    2. Postablative hypothyroidism      Plan:          Patient Instructions   Send me results of labs from Dr Magnolia Marin, who is following thyroid levels    FOR PRE-DIABETES, YOUR #1 MEDICATION IS EXERCISE --  ===============================================    Try to walk for a continuous 20 minutes every day - in your house or outside (huffing & puffing burns calories & strengthens your heart).     Be aware of everything you eat. Read labels.  Try writing it down so you can see where sugars & carbohydrates are creeping into your foods (drinks other than water, " salad dressing, snacks)    Remember, all white bread, white flour (used in baking)  white pasta, white rice, white potatoes, chips, crackers, cookies, sweets, sodas (even Gatorade, Powerade,Koolaid) , sugary coffee & tea,  desserts ----TURN INTO SUGAR.     ===============    Follow with GYN for female health & cancer prevention    ==============================  RECOMMENDATIONS FOR FEMALES  ==============================  Your #1 MEDICINE is DAILY EXERCISE - 15-20 minutes of huffing & puffing EVERY DAY.     Prevent the #1 cause of death- cardiovascular disease (HEART ATTACK & STROKE) by checking for normal blood pressure, cholesterol, sugars, & by not smoking.     VACCINES: Yearly FLU shot    I recommend  high fiber (5 fresh fruits or vegetables daily), low fat diet and aerobic  exercise (huffing/ puffing/ sweating for 20 min straight at least 4 days a week)    Follow up yearly with mammogram, fasting lipids, CMP, CBC prior.   ==============================================================

## 2019-04-12 ENCOUNTER — TELEPHONE (OUTPATIENT)
Dept: FAMILY MEDICINE | Facility: CLINIC | Age: 39
End: 2019-04-12

## 2019-04-12 RX ORDER — BENZONATATE 200 MG/1
200 CAPSULE ORAL 3 TIMES DAILY PRN
Qty: 21 CAPSULE | Refills: 0 | Status: SHIPPED | OUTPATIENT
Start: 2019-04-12 | End: 2019-04-19

## 2019-04-12 NOTE — TELEPHONE ENCOUNTER
----- Message from Leticia Velazquez sent at 4/12/2019  9:07 AM CDT -----  Contact: pt [phone]  Patient would like to get medical advice.  Symptoms (please be specific):  Cough/congestion   How long has patient had these symptoms:  1 week   Pharmacy name and phone # (copy/paste from chart):  [id number] - [place], [place] - [address] [phone] (Phone)  [phone] (Fax)  Any drug allergies (copy/paste from chart):      Would the patient rather a call back or a response via [facility]?:  Call back   Comments:  Pt is requesting something for cough as well a antibiotic called into pharmacy

## 2019-04-12 NOTE — TELEPHONE ENCOUNTER
Pt has tried OTC cough medications.  Appt recommended.  Pt requesting Rx for Tessalon Pearls which Angela Holliday has prescribed in the past when needed.

## 2019-05-12 DIAGNOSIS — Z30.011 ENCOUNTER FOR INITIAL PRESCRIPTION OF CONTRACEPTIVE PILLS: ICD-10-CM

## 2019-05-16 RX ORDER — NORGESTIMATE AND ETHINYL ESTRADIOL 7DAYSX3 28
1 KIT ORAL DAILY
Qty: 28 TABLET | Refills: 0 | Status: SHIPPED | OUTPATIENT
Start: 2019-05-16 | End: 2019-06-03

## 2019-06-03 ENCOUNTER — OFFICE VISIT (OUTPATIENT)
Dept: OBSTETRICS AND GYNECOLOGY | Facility: CLINIC | Age: 39
End: 2019-06-03
Attending: OBSTETRICS & GYNECOLOGY
Payer: COMMERCIAL

## 2019-06-03 ENCOUNTER — TELEPHONE (OUTPATIENT)
Dept: OBSTETRICS AND GYNECOLOGY | Facility: CLINIC | Age: 39
End: 2019-06-03

## 2019-06-03 VITALS
DIASTOLIC BLOOD PRESSURE: 70 MMHG | HEIGHT: 62 IN | WEIGHT: 220.44 LBS | SYSTOLIC BLOOD PRESSURE: 112 MMHG | BODY MASS INDEX: 40.57 KG/M2

## 2019-06-03 DIAGNOSIS — Z30.014 ENCOUNTER FOR INITIAL PRESCRIPTION OF INTRAUTERINE CONTRACEPTIVE DEVICE (IUD): Primary | ICD-10-CM

## 2019-06-03 DIAGNOSIS — Z01.419 VISIT FOR GYNECOLOGIC EXAMINATION: Primary | ICD-10-CM

## 2019-06-03 PROCEDURE — 99395 PREV VISIT EST AGE 18-39: CPT | Mod: S$GLB,,, | Performed by: OBSTETRICS & GYNECOLOGY

## 2019-06-03 PROCEDURE — 99395 PR PREVENTIVE VISIT,EST,18-39: ICD-10-PCS | Mod: S$GLB,,, | Performed by: OBSTETRICS & GYNECOLOGY

## 2019-06-03 PROCEDURE — 99999 PR PBB SHADOW E&M-EST. PATIENT-LVL II: ICD-10-PCS | Mod: PBBFAC,,, | Performed by: OBSTETRICS & GYNECOLOGY

## 2019-06-03 PROCEDURE — 99999 PR PBB SHADOW E&M-EST. PATIENT-LVL II: CPT | Mod: PBBFAC,,, | Performed by: OBSTETRICS & GYNECOLOGY

## 2019-06-03 NOTE — TELEPHONE ENCOUNTER
----- Message from Domonique Abraham sent at 6/3/2019  1:02 PM CDT -----  Contact: pt  Name of Who is Calling: Katiuska      What is the request in detail: requesting a call back to schedule her paraguard placement. Please call and advise      Can the clinic reply by [facility]: yes      What Number to Call Back if not in [facility]: [phone]

## 2019-06-03 NOTE — PROGRESS NOTES
"CC: Well woman exam    Katiuska Salazar is a 39 y.o. female  presents for a well woman exam.  She has no issues, problems, or complaints.    She is interested in contraception options - LARC or permanent.    Past Medical History:   Diagnosis Date    Abnormal Pap smear 5 yrs ago    repap    Anemia     History of blood transfusion 2006    Thyroid disease     hx of MARIN tx at age 22, tx Dr Magnolia Marin       Past Surgical History:   Procedure Laterality Date     SECTION  2015    DELIVERY-CEASAREAN SECTION N/A 2015    Performed by Tanya Welch MD at [facility]       OB History    Para Term  AB Living   2 1 1   1 2   SAB TAB Ectopic Multiple Live Births     1   1 2      # Outcome Date GA Lbr Regan/2nd Weight Sex Delivery Anes PTL Lv   2A Term 09/25/15 37w1d  2.03 kg (4 lb 7.6 oz) F CS-LTranv Spinal N GLORIA   2B Term 09/25/15 37w1d  2.915 kg (6 lb 6.8 oz) M CS-LTranv Spinal N GLORIA   1 TAB                Family History   Problem Relation Age of Onset    Thyroid disease Mother     Hypertension Mother     No Known Problems Father     No Known Problems Brother     No Known Problems Brother     Lung cancer Maternal Grandmother     Pancreatic cancer Maternal Aunt     No Known Problems Son     No Known Problems Daughter     [place] legs Neg Hx     Breast cancer Neg Hx     Ovarian cancer Neg Hx     Colon cancer Neg Hx        Social History     Tobacco Use    Smoking status: Never Smoker    Smokeless tobacco: Never Used   Substance Use Topics    Alcohol use: Yes     Comment: socially 0-1 per week    Drug use: No       /70   Ht 5' 2" (1.575 m)   Wt 100 kg (220 lb 7.4 oz)   LMP 2019   BMI 40.32 kg/m²     ROS:  GENERAL: Denies weight gain or weight loss. Feeling well overall.   SKIN: Denies rash or lesions.   HEAD: Denies head injury or headache.   NODES: Denies enlarged lymph nodes.   CHEST: Denies chest pain or shortness of breath.   CARDIOVASCULAR: " Denies palpitations or left sided chest pain.   ABDOMEN: No abdominal pain, constipation, diarrhea, nausea, vomiting or rectal bleeding.   URINARY: No frequency, dysuria, hematuria, or burning on urination.  REPRODUCTIVE: See HPI.   BREASTS: The patient performs breast self-examination and denies pain, lumps, or nipple discharge.   HEMATOLOGIC: No easy bruisability or excessive bleeding.  MUSCULOSKELETAL: Denies joint pain or swelling.   NEUROLOGIC: Denies syncope or weakness.   PSYCHIATRIC: Denies depression, anxiety or mood swings.    Physical Exam:    APPEARANCE: Well nourished, well developed, in no acute distress.  AFFECT: WNL, alert and oriented x 3  SKIN: No acne or hirsutism  NECK: Neck symmetric without masses or thyromegaly  NODES: No inguinal, cervical, axillary, or femoral lymph node enlargement  CHEST: Good respiratory effect  ABDOMEN: Soft.  No tenderness or masses.  No hepatosplenomegaly.  No hernias.  BREASTS: Symmetrical, no skin changes or visible lesions.  No palpable masses, nipple discharge bilaterally.  PELVIC: Normal external genitalia without lesions.  Normal hair distribution.  Adequate perineal body, normal urethral meatus.  Vagina moist and well rugated without lesions or discharge.  Cervix pink, without lesions, discharge or tenderness.  No significant cystocele or rectocele.  Bimanual exam shows uterus to be normal size, regular, mobile and nontender.  Adnexa without masses or tenderness  but difficult to assess due to body habitus.    EXTREMITIES: No edema.    ASSESSMENT AND PLAN  1. Visit for gynecologic examination         Patient was counseled today on A.C.S. Pap guidelines and recommendations for yearly pelvic exams, pap smears every 5 years with HPV co-testing, and monthly self breast exams; to see her PCP for other health maintenance.     Discussed contraception options : vasectomy, tuba ligation, LARCS.  She is interested Paragard.  Will read about it and contact the office when  she is ready to have it ordered.      Follow up in about 1 year (around 6/3/2020).

## 2019-06-03 NOTE — TELEPHONE ENCOUNTER
Patient requesting Paragard IUD insertion appointment. Can you review, sign, and send to [facility]?

## 2019-06-03 NOTE — TELEPHONE ENCOUNTER
Contacted the patient to schedule Paragard insertion. Patient agreed to an appointment on 7/15 at 10:45AM. Patient instructed to take Ibuprofen about 1 hour before her appointment because she may have some cramping with insertion. Patient verbalized understanding.

## 2019-07-15 ENCOUNTER — PROCEDURE VISIT (OUTPATIENT)
Dept: OBSTETRICS AND GYNECOLOGY | Facility: CLINIC | Age: 39
End: 2019-07-15
Attending: OBSTETRICS & GYNECOLOGY
Payer: COMMERCIAL

## 2019-07-15 VITALS
HEIGHT: 62 IN | DIASTOLIC BLOOD PRESSURE: 78 MMHG | WEIGHT: 223.13 LBS | BODY MASS INDEX: 41.06 KG/M2 | SYSTOLIC BLOOD PRESSURE: 122 MMHG

## 2019-07-15 DIAGNOSIS — Z30.430 ENCOUNTER FOR IUD INSERTION: Primary | ICD-10-CM

## 2019-07-15 LAB
B-HCG UR QL: NEGATIVE
C TRACH DNA SPEC QL NAA+PROBE: NOT DETECTED
CTP QC/QA: YES
N GONORRHOEA DNA SPEC QL NAA+PROBE: NOT DETECTED

## 2019-07-15 PROCEDURE — 87491 CHLMYD TRACH DNA AMP PROBE: CPT

## 2019-07-15 PROCEDURE — 81025 URINE PREGNANCY TEST: CPT | Mod: S$GLB,,, | Performed by: OBSTETRICS & GYNECOLOGY

## 2019-07-15 PROCEDURE — 58300 INSERT INTRAUTERINE DEVICE: CPT | Mod: S$GLB,,, | Performed by: OBSTETRICS & GYNECOLOGY

## 2019-07-15 PROCEDURE — 81025 POCT URINE PREGNANCY: ICD-10-PCS | Mod: S$GLB,,, | Performed by: OBSTETRICS & GYNECOLOGY

## 2019-07-15 PROCEDURE — 58300 INSERTION OF IUD: ICD-10-PCS | Mod: S$GLB,,, | Performed by: OBSTETRICS & GYNECOLOGY

## 2019-07-15 NOTE — PROCEDURES
Insertion of IUD  Date/Time: 7/15/2019 11:31 AM  Performed by: Tanya Welch MD  Authorized by: Tanya Welch MD     Consent:     Consent obtained:  Written    Consent given by:  Patient    Procedure risks and benefits discussed: yes      Patient questions answered: yes      Patient agrees, verbalizes understanding, and wants to proceed: yes      Educational handouts given: yes      Instructions and paperwork completed: yes    Procedure:     Negative urine pregnancy test: yes      Cervix cleaned and prepped: yes      Speculum placed in vagina: yes      Tenaculum applied to cervix: yes      Uterus sounded: yes      Uterus sound depth (cm):  6    IUD inserted with no complications: yes      IUD type:  ParaGard    Strings trimmed: yes    Post-procedure:     Patient tolerated procedure well: yes      Patient will follow up after next period: yes        
written material/verbal instruction

## 2019-08-09 ENCOUNTER — OFFICE VISIT (OUTPATIENT)
Dept: OBSTETRICS AND GYNECOLOGY | Facility: CLINIC | Age: 39
End: 2019-08-09
Payer: COMMERCIAL

## 2019-08-09 VITALS
HEIGHT: 62 IN | BODY MASS INDEX: 40.48 KG/M2 | WEIGHT: 220 LBS | DIASTOLIC BLOOD PRESSURE: 76 MMHG | SYSTOLIC BLOOD PRESSURE: 120 MMHG

## 2019-08-09 DIAGNOSIS — Z30.431 IUD CHECK UP: Primary | ICD-10-CM

## 2019-08-09 PROBLEM — Z97.5 IUD (INTRAUTERINE DEVICE) IN PLACE: Status: ACTIVE | Noted: 2019-08-09

## 2019-08-09 PROCEDURE — 99499 NO LOS: ICD-10-PCS | Mod: S$GLB,,, | Performed by: NURSE PRACTITIONER

## 2019-08-09 PROCEDURE — 99499 UNLISTED E&M SERVICE: CPT | Mod: S$GLB,,, | Performed by: NURSE PRACTITIONER

## 2019-08-09 PROCEDURE — 99999 PR PBB SHADOW E&M-EST. PATIENT-LVL III: CPT | Mod: PBBFAC,,, | Performed by: NURSE PRACTITIONER

## 2019-08-09 PROCEDURE — 99999 PR PBB SHADOW E&M-EST. PATIENT-LVL III: ICD-10-PCS | Mod: PBBFAC,,, | Performed by: NURSE PRACTITIONER

## 2019-08-09 NOTE — PROGRESS NOTES
CC: IUD check    Pt is a 38 y/o female  presents for IUD check. Patient had a Paragard placed on 7/15/19. She is not having problems with bleeding, cramping, fever or discharge. She has not tried to feel the strings.       ROS:  GENERAL: Feeling well overall. Denies fever or chills.   SKIN: Denies rash or lesions.   HEAD: Denies head injury or headache.   NODES: Denies enlarged lymph nodes.   CHEST: Denies chest pain or shortness of breath.   CARDIOVASCULAR: Denies palpitations or left sided chest pain.   ABDOMEN: No abdominal pain, constipation, diarrhea, nausea, vomiting or rectal bleeding.   URINARY: No dysuria, hematuria, or burning on urination.  REPRODUCTIVE: See HPI.   BREASTS: Denies pain, lumps, or nipple discharge.   HEMATOLOGIC: No easy bruisability or excessive bleeding.   MUSCULOSKELETAL: Denies joint pain or swelling.   NEUROLOGIC: Denies syncope or weakness.   PSYCHIATRIC: Denies depression, anxiety or mood swings.      PHYSICAL EXAM:  Abdomen: Soft, non-tender, non-distended  Vulva: No lesions  Vaginal: No lesions, no abnormal discharge  Cervix: No discharge, no CMT, IUD strings visible at os (2 cm out)  Uterus: Normal size, non-tender  Adnexa: No masses, non-tender    ASSESSMENT AND PLAN:  1. IUD surveillance    Patient counseled on IUD danger signs and how to check the strings reviewed.    Return for annual GYN exam

## 2019-09-26 ENCOUNTER — OFFICE VISIT (OUTPATIENT)
Dept: PRIMARY CARE CLINIC | Facility: CLINIC | Age: 39
End: 2019-09-26
Payer: COMMERCIAL

## 2019-09-26 VITALS
HEIGHT: 62 IN | TEMPERATURE: 98 F | SYSTOLIC BLOOD PRESSURE: 100 MMHG | WEIGHT: 222.44 LBS | HEART RATE: 84 BPM | DIASTOLIC BLOOD PRESSURE: 66 MMHG | BODY MASS INDEX: 40.93 KG/M2

## 2019-09-26 DIAGNOSIS — F41.9 ANXIETY: ICD-10-CM

## 2019-09-26 DIAGNOSIS — M79.89 LEFT LEG SWELLING: ICD-10-CM

## 2019-09-26 DIAGNOSIS — R07.89 CHEST TIGHTNESS: Primary | ICD-10-CM

## 2019-09-26 PROCEDURE — 99999 PR PBB SHADOW E&M-EST. PATIENT-LVL III: ICD-10-PCS | Mod: PBBFAC,,, | Performed by: FAMILY MEDICINE

## 2019-09-26 PROCEDURE — 93005 EKG 12-LEAD: ICD-10-PCS | Mod: S$GLB,,, | Performed by: FAMILY MEDICINE

## 2019-09-26 PROCEDURE — 93010 EKG 12-LEAD: ICD-10-PCS | Mod: S$GLB,,, | Performed by: INTERNAL MEDICINE

## 2019-09-26 PROCEDURE — 99214 PR OFFICE/OUTPT VISIT, EST, LEVL IV, 30-39 MIN: ICD-10-PCS | Mod: S$GLB,,, | Performed by: FAMILY MEDICINE

## 2019-09-26 PROCEDURE — 99214 OFFICE O/P EST MOD 30 MIN: CPT | Mod: S$GLB,,, | Performed by: FAMILY MEDICINE

## 2019-09-26 PROCEDURE — 93010 ELECTROCARDIOGRAM REPORT: CPT | Mod: S$GLB,,, | Performed by: INTERNAL MEDICINE

## 2019-09-26 PROCEDURE — 99999 PR PBB SHADOW E&M-EST. PATIENT-LVL III: CPT | Mod: PBBFAC,,, | Performed by: FAMILY MEDICINE

## 2019-09-26 PROCEDURE — 3008F BODY MASS INDEX DOCD: CPT | Mod: CPTII,S$GLB,, | Performed by: FAMILY MEDICINE

## 2019-09-26 PROCEDURE — 3008F PR BODY MASS INDEX (BMI) DOCUMENTED: ICD-10-PCS | Mod: CPTII,S$GLB,, | Performed by: FAMILY MEDICINE

## 2019-09-26 PROCEDURE — 93005 ELECTROCARDIOGRAM TRACING: CPT | Mod: S$GLB,,, | Performed by: FAMILY MEDICINE

## 2019-09-26 NOTE — PROGRESS NOTES
Subjective:      Patient ID: Katiuska Salazar is a 39 y.o. female.    Chief Complaint: Chest Pain (left region); Arm Pain (left); and Leg Pain (left)    Here today for left effort chest discomfort, tightness that comes and goes for several hours at a time.  Does not awaken from sleep.  Denies any shortness of breath no diaphoresis.  Similar episode occurred about 10 years ago when she had tightness in chest.  We reviewed together 2018 echocardiogram that was evaluated by her previous PCP after heart murmur was found.    She also has some left calf tense sensation, and to the back of the left thigh, she had some discoloration bruising on the left medial knee, denies any trauma.  She did drive 3 hr to [place]. Did not fly anywhere.     She does attributes much of her symptoms to stress at home and at work.  She has 4-year-old twins.  Yesterday was her children's birthday, she did not sleep much the night before.  She felt she had an anxiety attack in June and July and it is scarring more regularly.    STRESS ECHO 7/27/2018 -  CONCLUSIONS     1 - Normal left ventricular systolic function (EF 55-60%).     2 - No wall motion abnormalities.     3 - Normal left ventricular diastolic function.     4 - Normal right ventricular systolic function .     5 - The estimated PA systolic pressure is 24 mmHg.     6 - Trivial to mild mitral regurgitation.     7 - Mild tricuspid regurgitation.         Current Outpatient Medications:     thyroid, pork, (ARMOUR THYROID) 180 mg Tab, Take 1 tablet by mouth once daily., Disp: 30 tablet, Rfl: 5    Current Facility-Administered Medications:     copper intrauterine device 380 square mm  mm, 380 mm, Intrauterine, , Tanya Welch MD, 380 mm at 07/15/19 1131        Past Medical History:   Diagnosis Date    Abnormal Pap smear 5 yrs ago    repap    Anemia     History of blood transfusion 2006    Thyroid disease     hx of MARIN tx at age 22, tx Dr Magnolia Marin  "    Past Surgical History:   Procedure Laterality Date     SECTION  2015     Social History     Social History Narrative     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, , 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,  trying to get regular exercise, pilates/yoga , GYN Sancho     Family History   Problem Relation Age of Onset    Thyroid disease Mother     Hypertension Mother     No Known Problems Father     No Known Problems Brother     No Known Problems Brother     Lung cancer Maternal Grandmother     Pancreatic cancer Maternal Aunt     No Known Problems Son     No Known Problems Daughter     [place] legs Neg Hx     Breast cancer Neg Hx     Ovarian cancer Neg Hx     Colon cancer Neg Hx      Vitals:    19 1350   BP: 100/66   Pulse: 84   Temp: 98.3 °F (36.8 °C)   Weight: 100.9 kg (222 lb 7.1 oz)   Height: 5' 2" (1.575 m)   PainSc:   3     Objective:   Physical Exam   Cardiovascular: Normal rate, regular rhythm and normal heart sounds.   Pulmonary/Chest: Effort normal and breath sounds normal. No respiratory distress. She exhibits no tenderness.   Musculoskeletal:   LEFT knee no swelling, negative Estevan's no pain with walking along the joint line, healing contusion L medial knee 2+ pulses, less than 2 second capillary refill, normal gait   Psychiatric: She has a normal mood and affect. Her behavior is normal. Judgment and thought content normal.     Assessment:     1. Chest tightness    2. Left leg swelling    3. Anxiety      Plan:     Orders Placed This Encounter    Holter monitor - 24 hour    CV Ultrasound doppler venous DVT leg left    EKG 12-lead     We can discuss anti anxiety medications at any time if she would to try    I will email results. To ER if symptoms worsen    There are no Patient Instructions on file for this visit.                            "

## 2019-10-02 ENCOUNTER — HOSPITAL ENCOUNTER (OUTPATIENT)
Dept: CARDIOLOGY | Facility: HOSPITAL | Age: 39
Discharge: HOME | End: 2019-10-02
Attending: FAMILY MEDICINE
Payer: COMMERCIAL

## 2019-10-02 DIAGNOSIS — M79.89 LEFT LEG SWELLING: ICD-10-CM

## 2019-10-02 DIAGNOSIS — R07.89 CHEST TIGHTNESS: ICD-10-CM

## 2019-10-02 PROCEDURE — 93225 XTRNL ECG REC<48 HRS REC: CPT

## 2019-10-02 PROCEDURE — 93227 XTRNL ECG REC<48 HR R&I: CPT | Mod: ,,, | Performed by: INTERNAL MEDICINE

## 2019-10-02 PROCEDURE — 93971 CV US DOPPLER VENOUS LEG LEFT (CUPID ONLY): ICD-10-PCS | Mod: 26,LT,, | Performed by: INTERNAL MEDICINE

## 2019-10-02 PROCEDURE — 93971 EXTREMITY STUDY: CPT | Mod: LT

## 2019-10-02 PROCEDURE — 93227 HOLTER MONITOR - 24 HOUR (CUPID ONLY): ICD-10-PCS | Mod: ,,, | Performed by: INTERNAL MEDICINE

## 2019-10-02 PROCEDURE — 93971 EXTREMITY STUDY: CPT | Mod: 26,LT,, | Performed by: INTERNAL MEDICINE

## 2019-10-07 LAB
OHS CV EVENT MONITOR DAY: 1
OHS CV HOLTER LENGTH DECIMAL HOURS: 47.98
OHS CV HOLTER LENGTH HOURS: 23
OHS CV HOLTER LENGTH MINUTES: 59

## 2019-10-07 NOTE — PROGRESS NOTES
Hi. Good news again - your heart rate was normal the entire Holter monitor study.     Let me know if your symptoms persist.     Take care!

## 2019-10-15 ENCOUNTER — OFFICE VISIT (OUTPATIENT)
Dept: PRIMARY CARE CLINIC | Facility: CLINIC | Age: 39
End: 2019-10-15
Payer: COMMERCIAL

## 2019-10-15 VITALS
BODY MASS INDEX: 41.46 KG/M2 | DIASTOLIC BLOOD PRESSURE: 80 MMHG | WEIGHT: 225.31 LBS | SYSTOLIC BLOOD PRESSURE: 110 MMHG | HEIGHT: 62 IN | TEMPERATURE: 99 F | HEART RATE: 84 BPM

## 2019-10-15 DIAGNOSIS — J40 BRONCHITIS: Primary | ICD-10-CM

## 2019-10-15 DIAGNOSIS — J30.89 ENVIRONMENTAL AND SEASONAL ALLERGIES: ICD-10-CM

## 2019-10-15 PROCEDURE — 99999 PR PBB SHADOW E&M-EST. PATIENT-LVL III: CPT | Mod: PBBFAC,,, | Performed by: NURSE PRACTITIONER

## 2019-10-15 PROCEDURE — 99213 OFFICE O/P EST LOW 20 MIN: CPT | Mod: S$GLB,,, | Performed by: NURSE PRACTITIONER

## 2019-10-15 PROCEDURE — 99213 PR OFFICE/OUTPT VISIT, EST, LEVL III, 20-29 MIN: ICD-10-PCS | Mod: S$GLB,,, | Performed by: NURSE PRACTITIONER

## 2019-10-15 PROCEDURE — 99999 PR PBB SHADOW E&M-EST. PATIENT-LVL III: ICD-10-PCS | Mod: PBBFAC,,, | Performed by: NURSE PRACTITIONER

## 2019-10-15 PROCEDURE — 3008F BODY MASS INDEX DOCD: CPT | Mod: CPTII,S$GLB,, | Performed by: NURSE PRACTITIONER

## 2019-10-15 PROCEDURE — 3008F PR BODY MASS INDEX (BMI) DOCUMENTED: ICD-10-PCS | Mod: CPTII,S$GLB,, | Performed by: NURSE PRACTITIONER

## 2019-10-15 RX ORDER — FLUTICASONE PROPIONATE 50 MCG
1 SPRAY, SUSPENSION (ML) NASAL DAILY
Qty: 16 G | Refills: 0 | Status: SHIPPED | OUTPATIENT
Start: 2019-10-15

## 2019-10-15 RX ORDER — ALBUTEROL SULFATE 90 UG/1
2 AEROSOL, METERED RESPIRATORY (INHALATION) EVERY 4 HOURS PRN
Qty: 1 INHALER | Refills: 0 | Status: SHIPPED | OUTPATIENT
Start: 2019-10-15 | End: 2021-03-11

## 2019-10-15 RX ORDER — MONTELUKAST SODIUM 10 MG/1
10 TABLET ORAL NIGHTLY
Qty: 30 TABLET | Refills: 0 | Status: SHIPPED | OUTPATIENT
Start: 2019-10-15 | End: 2019-11-14

## 2019-10-15 RX ORDER — PROMETHAZINE HYDROCHLORIDE AND DEXTROMETHORPHAN HYDROBROMIDE 6.25; 15 MG/5ML; MG/5ML
5 SYRUP ORAL NIGHTLY PRN
Qty: 120 ML | Refills: 0 | Status: SHIPPED | OUTPATIENT
Start: 2019-10-15 | End: 2019-10-25

## 2019-10-15 NOTE — PROGRESS NOTES
Subjective:       Patient ID: Katiuska Salazar is a 39 y.o. female.    Chief Complaint: Cough    Ms. Salazar presents for a cough she has had on and off since Labor Day, that has become more persistent. She is now having trouble sleeping and experiencing back soreness due to the constant coughing, which is worse at night. The cough is productive of yellow/brown sputum in AM and clear sputum in PM.     Review of Systems   Constitutional: Negative for chills, diaphoresis and fever.   HENT: Positive for postnasal drip and rhinorrhea. Negative for facial swelling.    Eyes: Negative for visual disturbance.   Respiratory: Positive for cough and wheezing. Negative for shortness of breath.    Cardiovascular: Negative for chest pain.   Gastrointestinal: Negative for diarrhea, nausea and vomiting.   Genitourinary: Negative for dysuria.   Musculoskeletal: Negative for gait problem.   Skin: Negative for rash.   Neurological: Positive for headaches.   Psychiatric/Behavioral: Negative for confusion.       Objective:      Physical Exam   Constitutional: She is oriented to person, place, and time. She appears well-developed. No distress.   obese   HENT:   Head: Normocephalic and atraumatic.   Left Ear: A middle ear effusion is present.   Nose: Mucosal edema and rhinorrhea present.   Mouth/Throat: Oropharyngeal exudate present. No posterior oropharyngeal erythema.   Eyes: No scleral icterus.   Neck: Normal range of motion. Neck supple.   Cardiovascular: Normal rate, regular rhythm and normal heart sounds.   Pulmonary/Chest: Effort normal. No stridor. No respiratory distress. She has wheezes. She has no rales.   Lymphadenopathy:     She has no cervical adenopathy.   Neurological: She is alert and oriented to person, place, and time.   Skin: Skin is warm and dry. She is not diaphoretic.   Psychiatric: She has a normal mood and affect. Her behavior is normal.   Nursing note and vitals reviewed.      Assessment:       1.  Bronchitis    2. Environmental and seasonal allergies        Plan:   1. Bronchitis  - montelukast (SINGULAIR) 10 mg tablet; Take 1 tablet (10 mg total) by mouth every evening.  Dispense: 30 tablet; Refill: 0  - albuterol (PROVENTIL/VENTOLIN HFA) 90 mcg/actuation inhaler; Inhale 2 puffs into the lungs every 4 (four) hours as needed for Wheezing.  Dispense: 1 Inhaler; Refill: 0  - promethazine-dextromethorphan (PROMETHAZINE-DM) 6.25-15 mg/5 mL Syrp; Take 5 mLs by mouth nightly as needed.  Dispense: 120 mL; Refill: 0    2. Environmental and seasonal allergies  - montelukast (SINGULAIR) 10 mg tablet; Take 1 tablet (10 mg total) by mouth every evening.  Dispense: 30 tablet; Refill: 0  - fluticasone propionate (FLONASE) 50 mcg/actuation nasal spray; 1 spray (50 mcg total) by Each Nostril route once daily.  Dispense: 16 g; Refill: 0      Pt has been given instructions populated from [employer] database and has verbalized understanding of the after visit summary and information contained wherein.    Follow up with a primary care provider. May go to ER for acute shortness of breath, lightheadedness, fever, or any other emergent complaints or changes in condition.

## 2019-10-15 NOTE — PATIENT INSTRUCTIONS
- Use your left hand to instill Flonase into your right nostril. After you spray the solution, bend your head forward and to the right.  Then, using your right hand, instill Flonase into your left nostril, then tilt your head forward and to the left.

## 2019-10-18 NOTE — TELEPHONE ENCOUNTER
LOV 10/15/2019 cough    Received request from pharmacy for alternative medication for Albuterol Inhaler - product not covered    I contacted the pharmacy to obtain the covered alternatives.  Annika said to disregard, the insurance company will pay for brand but not generic form.

## 2020-09-24 ENCOUNTER — OFFICE VISIT (OUTPATIENT)
Dept: PRIMARY CARE CLINIC | Facility: CLINIC | Age: 40
End: 2020-09-24
Payer: COMMERCIAL

## 2020-09-24 DIAGNOSIS — J30.1 SEASONAL ALLERGIC RHINITIS DUE TO POLLEN: ICD-10-CM

## 2020-09-24 DIAGNOSIS — J40 BRONCHITIS: ICD-10-CM

## 2020-09-24 PROCEDURE — 99214 PR OFFICE/OUTPT VISIT, EST, LEVL IV, 30-39 MIN: ICD-10-PCS | Mod: 95,,, | Performed by: FAMILY MEDICINE

## 2020-09-24 PROCEDURE — 99214 OFFICE O/P EST MOD 30 MIN: CPT | Mod: 95,,, | Performed by: FAMILY MEDICINE

## 2020-09-24 RX ORDER — MONTELUKAST SODIUM 10 MG/1
10 TABLET ORAL DAILY
Qty: 30 TABLET | Refills: 12 | Status: SHIPPED | OUTPATIENT
Start: 2020-09-24 | End: 2021-11-07

## 2020-09-24 RX ORDER — PROMETHAZINE HYDROCHLORIDE AND DEXTROMETHORPHAN HYDROBROMIDE 6.25; 15 MG/5ML; MG/5ML
5 SYRUP ORAL EVERY 4 HOURS PRN
Qty: 240 ML | Refills: 1 | Status: SHIPPED | OUTPATIENT
Start: 2020-09-24 | End: 2020-10-04

## 2020-09-24 NOTE — PROGRESS NOTES
"Subjective:      Patient ID: Katiuska Salazar is a 40 y.o. female.    Chief Complaint: No chief complaint on file.    The patient location is: home  The chief complaint leading to consultation is: cough    Visit type: audiovisual    Face to Face time with patient: 12:14 - 25  20 minutes of total time spent on the encounter, which includes face to face time and non-face to face time preparing to see the patient (eg, review of tests), Obtaining and/or reviewing separately obtained history, Documenting clinical information in the electronic or other health record, Independently interpreting results (not separately reported) and communicating results to the patient/family/caregiver, or Care coordination (not separately reported).         Each patient to whom he or she provides medical services by telemedicine is:  (1) informed of the relationship between the physician and patient and the respective role of any other health care provider with respect to management of the patient; and (2) notified that he or she may decline to receive medical services by telemedicine and may withdraw from such care at any time.    Notes:     My cough is back. The kids came home last week with nasal congestion. Monday, 4d prior I had minor headache, next day usual itchy throat feeling, next day cough. Once the cough comes it's uncontrollable. I get some phlegm, it turns in to a hack, prevents sleeping. I"m trying to be diligent & not goes days like this. No fever. No achiness. Not feeling congested today. Not stuffy. ocuhing during day & at night. Worse lying down at dentist today. I've tried warm tea, cough syrup from last time which helped me sleep. During day, delsym, zyrtec daily, but it's not as strong. Lots of vitamin C. Lots water.     Last year & we reviewed her chart, she was seen in Oct for cough & NP dx with environmental allergies, tx with singulair, albuterol, phenergan DM & flonase. She felt better after 3d of singulair " & states it was a wonderful thing to have a diagnosis & something to treat it.     Over due for pap smear with GYN, has paraguard IUD      Current Outpatient Medications:     albuterol (PROVENTIL/VENTOLIN HFA) 90 mcg/actuation inhaler, Inhale 2 puffs into the lungs every 4 (four) hours as needed for Wheezing., Disp: 1 Inhaler, Rfl: 0    fluticasone propionate (FLONASE) 50 mcg/actuation nasal spray, 1 spray (50 mcg total) by Each Nostril route once daily., Disp: 16 g, Rfl: 0    montelukast (SINGULAIR) 10 mg tablet, Take 1 tablet (10 mg total) by mouth once daily., Disp: 30 tablet, Rfl: 12    promethazine-dextromethorphan (PROMETHAZINE-DM) 6.25-15 mg/5 mL Syrp, Take 5 mLs by mouth every 4 (four) hours as needed., Disp: 240 mL, Rfl: 1    thyroid, pork, (ARMOUR THYROID) 180 mg Tab, Take 1 tablet by mouth once daily., Disp: 30 tablet, Rfl: 5    Current Facility-Administered Medications:     copper intrauterine device 380 square mm  mm, 380 mm, Intrauterine, , Tanya Welch MD, 380 mm at 07/15/19 1131    Lab Results   Component Value Date    HGBA1C 5.8 (H) 07/20/2018    HGBA1C 5.6 02/13/2017    HGBA1C 5.4 04/22/2015     Lab Results   Component Value Date    MICALBCREAT 4.3 07/27/2018     Lab Results   Component Value Date    LDLCALC 74.2 07/20/2018    LDLCALC 86.0 02/13/2017    CHOL 138 07/20/2018    HDL 48 07/20/2018    TRIG 79 07/20/2018       Lab Results   Component Value Date     07/20/2018    K 4.4 07/20/2018     07/20/2018    CO2 25 07/20/2018    GLU 94 07/20/2018    BUN 12 07/20/2018    CREATININE 0.8 07/20/2018    CALCIUM 9.3 07/20/2018    PROT 7.8 07/20/2018    ALBUMIN 3.5 07/20/2018    BILITOT 0.4 07/20/2018    ALKPHOS 60 07/20/2018    AST 18 07/20/2018    ALT 13 07/20/2018    ANIONGAP 8 07/20/2018    ESTGFRAFRICA >60.0 07/20/2018    EGFRNONAA >60.0 07/20/2018    WBC 9.43 07/20/2018    HGB 11.0 (L) 07/20/2018    HGB 12.5 02/13/2017    HCT 36.9 (L) 07/20/2018    MCV 75 (L)  2018     2018    TSH 0.213 (L) 2018    HEPCAB Negative 2012       Lab Results   Component Value Date    DQKGUDMK38BW 28 (L) 02/15/2017    GXYDEYEV53PK 22 (L) 2013    VRYCUVBW47 519 2013    FERRITIN 12 (L) 2012         Past Medical History:   Diagnosis Date    Abnormal Pap smear 5 yrs ago    repap    Anemia     Bronchitis 2020    Once yearly Sept-Oct, environmental, tx w Singulair, Phenergan DM, Flonase, ZYrtec & albuterol    History of blood transfusion 2006    Seasonal allergic rhinitis due to pollen 2020    Thyroid disease     hx of MARIN tx at age 22, tx Dr Magnolia Marin     Past Surgical History:   Procedure Laterality Date     SECTION  2015     Social History     Social History Narrative     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, , twins  boy & girl,  trying to get regular exercise, pilates/yoga , GYN [facility]     Family History   Problem Relation Age of Onset    Thyroid disease Mother     Hypertension Mother     No Known Problems Father     No Known Problems Brother     No Known Problems Brother     Lung cancer Maternal Grandmother     Pancreatic cancer Maternal Aunt     No Known Problems Son     No Known Problems Daughter     [place] legs Neg Hx     Breast cancer Neg Hx     Ovarian cancer Neg Hx     Colon cancer Neg Hx      There were no vitals filed for this visit.  Objective:   Physical Exam  Assessment:     1. Bronchitis    2. Seasonal allergic rhinitis due to pollen      Plan:     Orders Placed This Encounter    montelukast (SINGULAIR) 10 mg tablet    promethazine-dextromethorphan (PROMETHAZINE-DM) 6.25-15 mg/5 mL Syrp     Continue zyrtec, flonase & above meds for her yearly seasonal flare up . Consider taking these meds daily Sept & Oct    She will make appt wiht GYN for overdue pap smear    ===========       An UPPER RESPIRATORY ILLNESS is initially caused by a virus or allergies 95% of the time. The goal to  "help you feel better is drying up the drainage & stopping the cough so the virus can run it's course in about 10 days. If your drainage becomes more thick and worse after 7-10 days of trying the below  over the counter medications, please make an appointment for further evaluation    If you have post nasal drip , "runny nose" or sore throat from clearing your throat :  Take an ANTIHISTAMINE  (Claritin or Zyrtec or Allegra ) AT NIGHT    Nasal Saline: Tilt head back and squirt into nostril 2-3 times until you taste saline in back of throat. Spit, and blow nose. Do this 4 times, alternating right and left nostril.   Do this routine 2-3 times per day.     NASAL SPRAY - Ipratropium , Nasacort (nasal steroid) or Flonase (nasal steroid)  Twice daily to decrease swelling & post nasal drip ------ very safe , works where the congestion is , & does not interfere with other medication or go through your blood stream. Consider prescription Ipratropium  Bromide nasal 0.03%    If you still have drainage or ear popping/ pressure/ decreased hearing, and you do NOT have high blood pressure:  You can add a DECONGESTANT like Sudafed (if it doesn't cause palpitations) or Sudafed PE  In the MORNING.     If you have thick mucus drainage from the nose or coughing up thick phlegm:  You can add a MUCOLYTIC like Mucinex (plain)    If your cough persists:  Nasal Spray - Ipratropium 4 times a day  Acetylcysteine reduces cough  You can add a COUGH SUPPRESSANT like Delsym (dextromethorphan) twice a day    If you have pain, sore throat, fever or chills:  You can alternate 250 mg of  Tylenol with 200mg of   ibuprofen every 3 hours - for the next 3 days  Discontinue and call me if  you have stomach upset      Drink lots of WATER until your urine is clear because all of the above medications can "dry you out" and cause constipation.     Come & see me  if you are not better in 7-10 days or if your symptoms worsen.    TELEMED  There are no Patient " Instructions on file for this visit.                            Answers for HPI/ROS submitted by the patient on 9/24/2020   Cough  Chronicity: chronic  Onset: in the past 7 days  Progression since onset: gradually worsening  Frequency: every few minutes  Cough characteristics: productive of sputum  chest pain: No  chills: No  ear congestion: No  ear pain: No  fever: No  headaches: No  heartburn: No  hemoptysis: No  myalgias: No  nasal congestion: No  postnasal drip: Yes  rash: No  rhinorrhea: No  shortness of breath: No  sore throat: No  sweats: No  weight loss: No  wheezing: No  Aggravated by: lying down  bronchitis: Yes  Treatments tried: leukotriene antagonists  Improvement on treatment: moderate

## 2020-10-09 ENCOUNTER — PATIENT MESSAGE (OUTPATIENT)
Dept: ADMINISTRATIVE | Facility: HOSPITAL | Age: 40
End: 2020-10-09

## 2021-03-09 ENCOUNTER — PATIENT OUTREACH (OUTPATIENT)
Dept: ADMINISTRATIVE | Facility: OTHER | Age: 41
End: 2021-03-09

## 2021-03-11 ENCOUNTER — OFFICE VISIT (OUTPATIENT)
Dept: OPTOMETRY | Facility: CLINIC | Age: 41
End: 2021-03-11
Payer: COMMERCIAL

## 2021-03-11 DIAGNOSIS — H52.12 MYOPIA WITH ASTIGMATISM, LEFT: ICD-10-CM

## 2021-03-11 DIAGNOSIS — H52.11 MYOPIA OF RIGHT EYE: ICD-10-CM

## 2021-03-11 DIAGNOSIS — H53.9 VISION DISTURBANCE: Primary | ICD-10-CM

## 2021-03-11 DIAGNOSIS — H52.202 MYOPIA WITH ASTIGMATISM, LEFT: ICD-10-CM

## 2021-03-11 PROCEDURE — 1126F PR PAIN SEVERITY QUANTIFIED, NO PAIN PRESENT: ICD-10-PCS | Mod: S$GLB,,, | Performed by: OPTOMETRIST

## 2021-03-11 PROCEDURE — 92015 DETERMINE REFRACTIVE STATE: CPT | Mod: S$GLB,,, | Performed by: OPTOMETRIST

## 2021-03-11 PROCEDURE — 92004 PR EYE EXAM, NEW PATIENT,COMPREHESV: ICD-10-PCS | Mod: S$GLB,,, | Performed by: OPTOMETRIST

## 2021-03-11 PROCEDURE — 99999 PR PBB SHADOW E&M-EST. PATIENT-LVL II: ICD-10-PCS | Mod: PBBFAC,,, | Performed by: OPTOMETRIST

## 2021-03-11 PROCEDURE — 99999 PR PBB SHADOW E&M-EST. PATIENT-LVL II: CPT | Mod: PBBFAC,,, | Performed by: OPTOMETRIST

## 2021-03-11 PROCEDURE — 92004 COMPRE OPH EXAM NEW PT 1/>: CPT | Mod: S$GLB,,, | Performed by: OPTOMETRIST

## 2021-03-11 PROCEDURE — 92015 PR REFRACTION: ICD-10-PCS | Mod: S$GLB,,, | Performed by: OPTOMETRIST

## 2021-03-11 PROCEDURE — 1126F AMNT PAIN NOTED NONE PRSNT: CPT | Mod: S$GLB,,, | Performed by: OPTOMETRIST

## 2021-03-11 RX ORDER — LORATADINE 10 MG/1
10 TABLET ORAL DAILY
COMMUNITY
End: 2022-12-27

## 2021-03-29 ENCOUNTER — IMMUNIZATION (OUTPATIENT)
Dept: PHARMACY | Facility: CLINIC | Age: 41
End: 2021-03-29
Payer: COMMERCIAL

## 2021-03-29 DIAGNOSIS — Z23 NEED FOR VACCINATION: Primary | ICD-10-CM

## 2021-04-05 ENCOUNTER — PATIENT MESSAGE (OUTPATIENT)
Dept: ADMINISTRATIVE | Facility: HOSPITAL | Age: 41
End: 2021-04-05

## 2021-04-26 ENCOUNTER — IMMUNIZATION (OUTPATIENT)
Dept: PHARMACY | Facility: CLINIC | Age: 41
End: 2021-04-26
Payer: COMMERCIAL

## 2021-04-26 DIAGNOSIS — Z23 NEED FOR VACCINATION: Primary | ICD-10-CM

## 2021-06-07 DIAGNOSIS — Z12.31 OTHER SCREENING MAMMOGRAM: Primary | ICD-10-CM

## 2021-06-07 DIAGNOSIS — E03.4 HYPOTHYROIDISM DUE TO ACQUIRED ATROPHY OF THYROID: ICD-10-CM

## 2021-06-09 RX ORDER — THYROID, PORCINE 180 MG/1
TABLET ORAL
Qty: 90 TABLET | Refills: 0 | Status: SHIPPED | OUTPATIENT
Start: 2021-06-09 | End: 2021-08-31

## 2021-07-07 ENCOUNTER — PATIENT MESSAGE (OUTPATIENT)
Dept: ADMINISTRATIVE | Facility: HOSPITAL | Age: 41
End: 2021-07-07

## 2021-09-03 ENCOUNTER — PATIENT MESSAGE (OUTPATIENT)
Dept: PRIMARY CARE CLINIC | Facility: CLINIC | Age: 41
End: 2021-09-03

## 2021-09-03 RX ORDER — PROMETHAZINE HYDROCHLORIDE AND DEXTROMETHORPHAN HYDROBROMIDE 6.25; 15 MG/5ML; MG/5ML
SYRUP ORAL
Qty: 240 ML | Refills: 0 | Status: SHIPPED | OUTPATIENT
Start: 2021-09-03 | End: 2021-12-30 | Stop reason: SDUPTHER

## 2021-10-05 ENCOUNTER — PATIENT MESSAGE (OUTPATIENT)
Dept: ADMINISTRATIVE | Facility: HOSPITAL | Age: 41
End: 2021-10-05

## 2021-11-07 RX ORDER — MONTELUKAST SODIUM 10 MG/1
TABLET ORAL
Qty: 90 TABLET | Refills: 0 | Status: SHIPPED | OUTPATIENT
Start: 2021-11-07 | End: 2022-02-10

## 2021-11-08 ENCOUNTER — TELEPHONE (OUTPATIENT)
Dept: PRIMARY CARE CLINIC | Facility: CLINIC | Age: 41
End: 2021-11-08
Payer: COMMERCIAL

## 2021-12-27 ENCOUNTER — PATIENT MESSAGE (OUTPATIENT)
Dept: PRIMARY CARE CLINIC | Facility: CLINIC | Age: 41
End: 2021-12-27
Payer: COMMERCIAL

## 2021-12-30 RX ORDER — AMOXICILLIN AND CLAVULANATE POTASSIUM 875; 125 MG/1; MG/1
1 TABLET, FILM COATED ORAL 2 TIMES DAILY
Qty: 14 TABLET | Refills: 0 | Status: SHIPPED | OUTPATIENT
Start: 2021-12-30 | End: 2022-12-27

## 2022-01-18 ENCOUNTER — PATIENT MESSAGE (OUTPATIENT)
Dept: ADMINISTRATIVE | Facility: HOSPITAL | Age: 42
End: 2022-01-18
Payer: COMMERCIAL

## 2022-01-21 DIAGNOSIS — E03.4 HYPOTHYROIDISM DUE TO ACQUIRED ATROPHY OF THYROID: ICD-10-CM

## 2022-01-21 RX ORDER — THYROID, PORCINE 180 MG/1
TABLET ORAL
Qty: 90 TABLET | Refills: 3 | Status: SHIPPED | OUTPATIENT
Start: 2022-01-21 | End: 2023-01-11 | Stop reason: ALTCHOICE

## 2022-01-21 NOTE — TELEPHONE ENCOUNTER
Care Due:                  Date            Visit Type   Department     Provider  --------------------------------------------------------------------------------                                [facility]                              VIRTUAL      [facility]  Last Visit: 09-      VISIT        CARE           Anne Plasencia  Next Visit: None Scheduled  None         None Found                                                            Last  Test          Frequency    Reason                     Performed    Due Date  --------------------------------------------------------------------------------    Office Visit  12 months..  joseph TRONCOSO......  09- 09-    TSH.........  12 months..  ABA...................  Not Found    Overdue    Powered by [employer]. Reference number: [id number].   1/21/2022 12:05:25 AM CST

## 2022-02-10 RX ORDER — MONTELUKAST SODIUM 10 MG/1
TABLET ORAL
Qty: 90 TABLET | Refills: 3 | Status: SHIPPED | OUTPATIENT
Start: 2022-02-10 | End: 2023-02-20

## 2022-02-10 NOTE — TELEPHONE ENCOUNTER
No new care gaps identified.  Powered by [employer]. Reference number: [id number].   2/10/2022 1:08:28 AM CST

## 2022-05-30 ENCOUNTER — PATIENT MESSAGE (OUTPATIENT)
Dept: ADMINISTRATIVE | Facility: HOSPITAL | Age: 42
End: 2022-05-30
Payer: COMMERCIAL

## 2022-07-12 ENCOUNTER — PATIENT MESSAGE (OUTPATIENT)
Dept: ADMINISTRATIVE | Facility: HOSPITAL | Age: 42
End: 2022-07-12
Payer: COMMERCIAL

## 2022-08-31 DIAGNOSIS — Z12.31 OTHER SCREENING MAMMOGRAM: ICD-10-CM

## 2022-09-06 ENCOUNTER — PATIENT MESSAGE (OUTPATIENT)
Dept: ADMINISTRATIVE | Facility: HOSPITAL | Age: 42
End: 2022-09-06

## 2022-12-22 PROBLEM — J40 BRONCHITIS: Status: RESOLVED | Noted: 2020-09-24 | Resolved: 2022-12-22
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
## 2022-12-27 ENCOUNTER — PATIENT MESSAGE (OUTPATIENT)
Dept: PRIMARY CARE CLINIC | Facility: CLINIC | Age: 42
End: 2022-12-27

## 2022-12-27 ENCOUNTER — OFFICE VISIT (OUTPATIENT)
Dept: PRIMARY CARE CLINIC | Facility: CLINIC | Age: 42
End: 2022-12-27
Attending: FAMILY MEDICINE
Payer: COMMERCIAL

## 2022-12-27 ENCOUNTER — APPOINTMENT (OUTPATIENT)
Dept: RADIOLOGY | Facility: OTHER | Age: 42
End: 2022-12-27
Attending: FAMILY MEDICINE
Payer: COMMERCIAL

## 2022-12-27 VITALS
BODY MASS INDEX: 42.5 KG/M2 | HEIGHT: 62 IN | DIASTOLIC BLOOD PRESSURE: 80 MMHG | OXYGEN SATURATION: 100 % | HEART RATE: 86 BPM | SYSTOLIC BLOOD PRESSURE: 124 MMHG | WEIGHT: 230.94 LBS

## 2022-12-27 DIAGNOSIS — M25.511 ACUTE PAIN OF RIGHT SHOULDER: ICD-10-CM

## 2022-12-27 DIAGNOSIS — R73.03 PREDIABETES: ICD-10-CM

## 2022-12-27 DIAGNOSIS — E66.01 MORBID OBESITY WITH BMI OF 40.0-44.9, ADULT: ICD-10-CM

## 2022-12-27 DIAGNOSIS — Z13.6 ENCOUNTER FOR LIPID SCREENING FOR CARDIOVASCULAR DISEASE: ICD-10-CM

## 2022-12-27 DIAGNOSIS — M25.511 CHRONIC RIGHT SHOULDER PAIN: ICD-10-CM

## 2022-12-27 DIAGNOSIS — E55.9 VITAMIN D DEFICIENCY: ICD-10-CM

## 2022-12-27 DIAGNOSIS — Z97.5 IUD (INTRAUTERINE DEVICE) IN PLACE: ICD-10-CM

## 2022-12-27 DIAGNOSIS — Z13.228 SCREENING FOR METABOLIC DISORDER: ICD-10-CM

## 2022-12-27 DIAGNOSIS — J30.1 SEASONAL ALLERGIC RHINITIS DUE TO POLLEN: ICD-10-CM

## 2022-12-27 DIAGNOSIS — Z13.220 ENCOUNTER FOR LIPID SCREENING FOR CARDIOVASCULAR DISEASE: ICD-10-CM

## 2022-12-27 DIAGNOSIS — Z12.31 ENCOUNTER FOR SCREENING MAMMOGRAM FOR BREAST CANCER: ICD-10-CM

## 2022-12-27 DIAGNOSIS — G89.29 CHRONIC RIGHT SHOULDER PAIN: ICD-10-CM

## 2022-12-27 DIAGNOSIS — E61.1 IRON DEFICIENCY: ICD-10-CM

## 2022-12-27 DIAGNOSIS — E89.0 POSTABLATIVE HYPOTHYROIDISM: ICD-10-CM

## 2022-12-27 DIAGNOSIS — Z13.0 SCREENING, IRON DEFICIENCY ANEMIA: ICD-10-CM

## 2022-12-27 DIAGNOSIS — Z00.01 ENCOUNTER FOR GENERAL ADULT MEDICAL EXAMINATION WITH ABNORMAL FINDINGS: Primary | ICD-10-CM

## 2022-12-27 PROCEDURE — 73030 XR SHOULDER COMPLETE 2 OR MORE VIEWS RIGHT: ICD-10-PCS | Mod: 26,RT,, | Performed by: RADIOLOGY

## 2022-12-27 PROCEDURE — 99999 PR PBB SHADOW E&M-EST. PATIENT-LVL V: ICD-10-PCS | Mod: PBBFAC,,, | Performed by: FAMILY MEDICINE

## 2022-12-27 PROCEDURE — 99396 PR PREVENTIVE VISIT,EST,40-64: ICD-10-PCS | Mod: S$GLB,,, | Performed by: FAMILY MEDICINE

## 2022-12-27 PROCEDURE — 99999 PR PBB SHADOW E&M-EST. PATIENT-LVL V: CPT | Mod: PBBFAC,,, | Performed by: FAMILY MEDICINE

## 2022-12-27 PROCEDURE — 73030 X-RAY EXAM OF SHOULDER: CPT | Mod: 26,RT,, | Performed by: RADIOLOGY

## 2022-12-27 PROCEDURE — 99396 PREV VISIT EST AGE 40-64: CPT | Mod: S$GLB,,, | Performed by: FAMILY MEDICINE

## 2022-12-27 PROCEDURE — 73030 X-RAY EXAM OF SHOULDER: CPT | Mod: TC,RT

## 2022-12-27 RX ORDER — NAPROXEN 500 MG/1
500 TABLET ORAL 2 TIMES DAILY PRN
Qty: 60 TABLET | Refills: 0 | Status: SHIPPED | OUTPATIENT
Start: 2022-12-27

## 2023-01-03 ENCOUNTER — LAB VISIT (OUTPATIENT)
Dept: LAB | Facility: HOSPITAL | Age: 43
End: 2023-01-03
Attending: FAMILY MEDICINE
Payer: COMMERCIAL

## 2023-01-03 ENCOUNTER — PATIENT MESSAGE (OUTPATIENT)
Dept: PRIMARY CARE CLINIC | Facility: CLINIC | Age: 43
End: 2023-01-03
Payer: COMMERCIAL

## 2023-01-03 DIAGNOSIS — D50.0 IRON DEFICIENCY ANEMIA DUE TO CHRONIC BLOOD LOSS: ICD-10-CM

## 2023-01-03 DIAGNOSIS — Z13.0 SCREENING, IRON DEFICIENCY ANEMIA: ICD-10-CM

## 2023-01-03 DIAGNOSIS — Z13.220 ENCOUNTER FOR LIPID SCREENING FOR CARDIOVASCULAR DISEASE: ICD-10-CM

## 2023-01-03 DIAGNOSIS — E89.0 POSTABLATIVE HYPOTHYROIDISM: Primary | ICD-10-CM

## 2023-01-03 DIAGNOSIS — Z13.6 ENCOUNTER FOR LIPID SCREENING FOR CARDIOVASCULAR DISEASE: ICD-10-CM

## 2023-01-03 DIAGNOSIS — R73.03 PREDIABETES: ICD-10-CM

## 2023-01-03 DIAGNOSIS — E55.9 VITAMIN D DEFICIENCY: ICD-10-CM

## 2023-01-03 DIAGNOSIS — E61.1 IRON DEFICIENCY: ICD-10-CM

## 2023-01-03 DIAGNOSIS — Z13.228 SCREENING FOR METABOLIC DISORDER: ICD-10-CM

## 2023-01-03 DIAGNOSIS — D50.0 IRON DEFICIENCY ANEMIA DUE TO CHRONIC BLOOD LOSS: Primary | ICD-10-CM

## 2023-01-03 DIAGNOSIS — E89.0 POSTABLATIVE HYPOTHYROIDISM: ICD-10-CM

## 2023-01-03 LAB
25(OH)D3+25(OH)D2 SERPL-MCNC: 69 NG/ML (ref 30–96)
ALBUMIN SERPL BCP-MCNC: 3.8 G/DL (ref 3.5–5.2)
ALP SERPL-CCNC: 77 U/L (ref 55–135)
ALT SERPL W/O P-5'-P-CCNC: 14 U/L (ref 10–44)
ANION GAP SERPL CALC-SCNC: 5 MMOL/L (ref 8–16)
AST SERPL-CCNC: 19 U/L (ref 10–40)
BASOPHILS # BLD AUTO: 0.06 K/UL (ref 0–0.2)
BASOPHILS NFR BLD: 0.8 % (ref 0–1.9)
BILIRUB SERPL-MCNC: 0.4 MG/DL (ref 0.1–1)
BUN SERPL-MCNC: 13 MG/DL (ref 6–20)
CALCIUM SERPL-MCNC: 9.5 MG/DL (ref 8.7–10.5)
CHLORIDE SERPL-SCNC: 105 MMOL/L (ref 95–110)
CHOLEST SERPL-MCNC: 195 MG/DL (ref 120–199)
CHOLEST/HDLC SERPL: 3.5 {RATIO} (ref 2–5)
CO2 SERPL-SCNC: 28 MMOL/L (ref 23–29)
CREAT SERPL-MCNC: 0.8 MG/DL (ref 0.5–1.4)
DIFFERENTIAL METHOD: ABNORMAL
EOSINOPHIL # BLD AUTO: 0.1 K/UL (ref 0–0.5)
EOSINOPHIL NFR BLD: 1.4 % (ref 0–8)
ERYTHROCYTE [DISTWIDTH] IN BLOOD BY AUTOMATED COUNT: 17.1 % (ref 11.5–14.5)
EST. GFR  (NO RACE VARIABLE): >60 ML/MIN/1.73 M^2
ESTIMATED AVG GLUCOSE: 117 MG/DL (ref 68–131)
FERRITIN SERPL-MCNC: 6 NG/ML (ref 20–300)
GLUCOSE SERPL-MCNC: 96 MG/DL (ref 70–110)
HBA1C MFR BLD: 5.7 % (ref 4–5.6)
HCT VFR BLD AUTO: 33.8 % (ref 37–48.5)
HDLC SERPL-MCNC: 55 MG/DL (ref 40–75)
HDLC SERPL: 28.2 % (ref 20–50)
HGB BLD-MCNC: 9.7 G/DL (ref 12–16)
IMM GRANULOCYTES # BLD AUTO: 0.03 K/UL (ref 0–0.04)
IMM GRANULOCYTES NFR BLD AUTO: 0.4 % (ref 0–0.5)
IRON SERPL-MCNC: 20 UG/DL (ref 30–160)
LDLC SERPL CALC-MCNC: 115.4 MG/DL (ref 63–159)
LYMPHOCYTES # BLD AUTO: 1.5 K/UL (ref 1–4.8)
LYMPHOCYTES NFR BLD: 21.2 % (ref 18–48)
MCH RBC QN AUTO: 21.2 PG (ref 27–31)
MCHC RBC AUTO-ENTMCNC: 28.7 G/DL (ref 32–36)
MCV RBC AUTO: 74 FL (ref 82–98)
MONOCYTES # BLD AUTO: 0.5 K/UL (ref 0.3–1)
MONOCYTES NFR BLD: 7.3 % (ref 4–15)
NEUTROPHILS # BLD AUTO: 4.9 K/UL (ref 1.8–7.7)
NEUTROPHILS NFR BLD: 68.9 % (ref 38–73)
NONHDLC SERPL-MCNC: 140 MG/DL
NRBC BLD-RTO: 0 /100 WBC
PLATELET # BLD AUTO: 339 K/UL (ref 150–450)
PMV BLD AUTO: 11.1 FL (ref 9.2–12.9)
POTASSIUM SERPL-SCNC: 4 MMOL/L (ref 3.5–5.1)
PROT SERPL-MCNC: 7.9 G/DL (ref 6–8.4)
RBC # BLD AUTO: 4.57 M/UL (ref 4–5.4)
SATURATED IRON: 4 % (ref 20–50)
SODIUM SERPL-SCNC: 138 MMOL/L (ref 136–145)
T3 SERPL-MCNC: 136 NG/DL (ref 60–180)
T4 FREE SERPL-MCNC: 0.64 NG/DL (ref 0.71–1.51)
TOTAL IRON BINDING CAPACITY: 517 UG/DL (ref 250–450)
TRANSFERRIN SERPL-MCNC: 349 MG/DL (ref 200–375)
TRIGL SERPL-MCNC: 123 MG/DL (ref 30–150)
TSH SERPL DL<=0.005 MIU/L-ACNC: 19.83 UIU/ML (ref 0.4–4)
WBC # BLD AUTO: 7.09 K/UL (ref 3.9–12.7)

## 2023-01-03 PROCEDURE — 84466 ASSAY OF TRANSFERRIN: CPT | Performed by: FAMILY MEDICINE

## 2023-01-03 PROCEDURE — 82728 ASSAY OF FERRITIN: CPT | Performed by: FAMILY MEDICINE

## 2023-01-03 PROCEDURE — 84439 ASSAY OF FREE THYROXINE: CPT | Performed by: FAMILY MEDICINE

## 2023-01-03 PROCEDURE — 83036 HEMOGLOBIN GLYCOSYLATED A1C: CPT | Performed by: FAMILY MEDICINE

## 2023-01-03 PROCEDURE — 80061 LIPID PANEL: CPT | Performed by: FAMILY MEDICINE

## 2023-01-03 PROCEDURE — 84480 ASSAY TRIIODOTHYRONINE (T3): CPT | Performed by: FAMILY MEDICINE

## 2023-01-03 PROCEDURE — 85025 COMPLETE CBC W/AUTO DIFF WBC: CPT | Performed by: FAMILY MEDICINE

## 2023-01-03 PROCEDURE — 82306 VITAMIN D 25 HYDROXY: CPT | Performed by: FAMILY MEDICINE

## 2023-01-03 PROCEDURE — 84443 ASSAY THYROID STIM HORMONE: CPT | Performed by: FAMILY MEDICINE

## 2023-01-03 PROCEDURE — 36415 COLL VENOUS BLD VENIPUNCTURE: CPT | Mod: PN | Performed by: FAMILY MEDICINE

## 2023-01-03 PROCEDURE — 80053 COMPREHEN METABOLIC PANEL: CPT | Performed by: FAMILY MEDICINE

## 2023-01-03 RX ORDER — DOCUSATE SODIUM 100 MG/1
100 CAPSULE, LIQUID FILLED ORAL 2 TIMES DAILY
Qty: 180 CAPSULE | Refills: 0
Start: 2023-01-03 | End: 2023-04-03

## 2023-01-03 RX ORDER — FERROUS GLUCONATE 324(38)MG
324 TABLET ORAL 2 TIMES DAILY WITH MEALS
Qty: 180 TABLET | Refills: 0 | Status: SHIPPED | OUTPATIENT
Start: 2023-01-03 | End: 2023-03-28

## 2023-01-04 ENCOUNTER — PATIENT MESSAGE (OUTPATIENT)
Dept: PRIMARY CARE CLINIC | Facility: CLINIC | Age: 43
End: 2023-01-04
Payer: COMMERCIAL

## 2023-01-09 NOTE — PROGRESS NOTES
Subjective:      Chief Complaint: Hypothyroidism    HPI: Katiuska Salazar is a 42 y.o. female who is here for an initial evaluation for thyroid.    With regards to her hypothyroidism:  Graves disease initially. S/p radioactive iodine ablation in her early 20s, now with hypothyroidism    Current medication:  Alba  Current dose: 180 mg (similar to 300 mcg levothyroxine). Been on this dose for a while   In the past had been on doses up to 240 mg (similar to 400 mcg LT4)    Rare missed doses    Lab Results   Component Value Date    TSH 19.833 (H) 01/03/2023    FREET4 0.64 (L) 01/03/2023    D0XTUTS 136 01/03/2023     Has prediabetes  Lab Results   Component Value Date    HGBA1C 5.7 (H) 01/03/2023     Also iron deficiency, anemia   Ferritin 6, hemoglobin 9.7, MCV 74    Symptoms  No   Yes  [x]    []  Weight gain  []    [x]  Fatigue  [x]    []  Constipation  [x]    []  Cold intolerance    [x]    []  Weight loss  [x]    []  Insomnia  [x]    []  Diarrhea  [x]    []  Heat intolerance  [x]    []  Palpitations    [x]    []  Irregular menstrual cycles    Shoulder issues, suspicion for frozen shoulder  Cough      Reviewed past medical, family, social history and updated as appropriate.    Review of Systems  As above    Objective:     Vitals:    01/11/23 0921   BP: 126/69   Pulse: 90     BP Readings from Last 5 Encounters:   01/11/23 126/69   12/27/22 124/80   10/15/19 110/80   09/26/19 100/66   08/09/19 120/76     Physical Exam  Vitals reviewed.   Constitutional:       General: She is not in acute distress.     Appearance: She is obese.   Cardiovascular:      Heart sounds: Normal heart sounds.   Pulmonary:      Effort: Pulmonary effort is normal.       Wt Readings from Last 5 Encounters:   01/11/23 0921 104.8 kg (231 lb 0.7 oz)   12/27/22 1021 104.8 kg (230 lb 14.9 oz)   10/15/19 1559 102.2 kg (225 lb 5 oz)   09/26/19 1350 100.9 kg (222 lb 7.1 oz)   08/09/19 0927 99.8 kg (220 lb 0.3 oz)     Lab Results   Component Value  Date    HGBA1C 5.7 (H) 01/03/2023    HGBA1C 5.8 (H) 07/20/2018    HGBA1C 5.6 02/13/2017    HGBA1C 5.4 04/22/2015     Lab Results   Component Value Date    CHOL 195 01/03/2023    CHOL 138 07/20/2018    HDL 55 01/03/2023    HDL 48 07/20/2018    LDLCALC 115.4 01/03/2023    LDLCALC 74.2 07/20/2018    TRIG 123 01/03/2023    TRIG 79 07/20/2018    CHOLHDL 28.2 01/03/2023    CHOLHDL 34.8 07/20/2018     Lab Results   Component Value Date     01/03/2023    K 4.0 01/03/2023     01/03/2023    CO2 28 01/03/2023    GLU 96 01/03/2023    BUN 13 01/03/2023    CREATININE 0.8 01/03/2023    CALCIUM 9.5 01/03/2023    PROT 7.9 01/03/2023    ALBUMIN 3.8 01/03/2023    BILITOT 0.4 01/03/2023    ALKPHOS 77 01/03/2023    AST 19 01/03/2023    ALT 14 01/03/2023    ANIONGAP 5 (L) 01/03/2023    ESTGFRAFRICA >60.0 07/20/2018    EGFRNONAA >60.0 07/20/2018    TSH 19.833 (H) 01/03/2023      Lab Results   Component Value Date    MICALBCREAT 4.3 07/27/2018       Assessment/Plan:     Postablative hypothyroidism  Graves disease initially around 2012   treated with radioactive iodine ablation.  Now with hypothyroidism    - on high dose armour thyroid.   clinically has a few symptoms  Last TSH high, T4 low, T3 normal.   With armour, it isn't unusual for T4 to be lower compared with t3 due to that medication having comparatively more t3 than is seen in humans.   but with high TSH, dose is not strong enough   increase to 240 mg/day armour   recheck in 6-8 weeks   adjust dose further if needed based on results   goal is a normal TSH.   may need an in-between dose      Morbid obesity with BMI of 40.0-44.9, adult  Body mass index is 42.26 kg/m².   ensure euthyroid as above   monitor weight      Iron deficiency   this can contribute to some of the fatigue symptoms  Keep f/u with PCP, continue supplementation      Follow up in about 1 year (around 1/11/2024) for lab review, further monitoring.      Car Noel MD  Endocrinology

## 2023-01-11 ENCOUNTER — OFFICE VISIT (OUTPATIENT)
Dept: ENDOCRINOLOGY | Facility: CLINIC | Age: 43
End: 2023-01-11
Attending: FAMILY MEDICINE
Payer: COMMERCIAL

## 2023-01-11 VITALS
HEIGHT: 62 IN | BODY MASS INDEX: 42.52 KG/M2 | SYSTOLIC BLOOD PRESSURE: 126 MMHG | WEIGHT: 231.06 LBS | DIASTOLIC BLOOD PRESSURE: 69 MMHG | HEART RATE: 90 BPM

## 2023-01-11 DIAGNOSIS — E03.4 HYPOTHYROIDISM DUE TO ACQUIRED ATROPHY OF THYROID: ICD-10-CM

## 2023-01-11 DIAGNOSIS — E66.01 MORBID OBESITY WITH BMI OF 40.0-44.9, ADULT: ICD-10-CM

## 2023-01-11 DIAGNOSIS — E89.0 POSTABLATIVE HYPOTHYROIDISM: ICD-10-CM

## 2023-01-11 DIAGNOSIS — E61.1 IRON DEFICIENCY: ICD-10-CM

## 2023-01-11 PROCEDURE — 1160F PR REVIEW ALL MEDS BY PRESCRIBER/CLIN PHARMACIST DOCUMENTED: ICD-10-PCS | Mod: CPTII,S$GLB,, | Performed by: INTERNAL MEDICINE

## 2023-01-11 PROCEDURE — 3044F PR MOST RECENT HEMOGLOBIN A1C LEVEL <7.0%: ICD-10-PCS | Mod: CPTII,S$GLB,, | Performed by: INTERNAL MEDICINE

## 2023-01-11 PROCEDURE — 3078F PR MOST RECENT DIASTOLIC BLOOD PRESSURE < 80 MM HG: ICD-10-PCS | Mod: CPTII,S$GLB,, | Performed by: INTERNAL MEDICINE

## 2023-01-11 PROCEDURE — 3008F PR BODY MASS INDEX (BMI) DOCUMENTED: ICD-10-PCS | Mod: CPTII,S$GLB,, | Performed by: INTERNAL MEDICINE

## 2023-01-11 PROCEDURE — 99204 PR OFFICE/OUTPT VISIT, NEW, LEVL IV, 45-59 MIN: ICD-10-PCS | Mod: S$GLB,,, | Performed by: INTERNAL MEDICINE

## 2023-01-11 PROCEDURE — 1159F PR MEDICATION LIST DOCUMENTED IN MEDICAL RECORD: ICD-10-PCS | Mod: CPTII,S$GLB,, | Performed by: INTERNAL MEDICINE

## 2023-01-11 PROCEDURE — 3078F DIAST BP <80 MM HG: CPT | Mod: CPTII,S$GLB,, | Performed by: INTERNAL MEDICINE

## 2023-01-11 PROCEDURE — 3044F HG A1C LEVEL LT 7.0%: CPT | Mod: CPTII,S$GLB,, | Performed by: INTERNAL MEDICINE

## 2023-01-11 PROCEDURE — 1160F RVW MEDS BY RX/DR IN RCRD: CPT | Mod: CPTII,S$GLB,, | Performed by: INTERNAL MEDICINE

## 2023-01-11 PROCEDURE — 3074F SYST BP LT 130 MM HG: CPT | Mod: CPTII,S$GLB,, | Performed by: INTERNAL MEDICINE

## 2023-01-11 PROCEDURE — 3008F BODY MASS INDEX DOCD: CPT | Mod: CPTII,S$GLB,, | Performed by: INTERNAL MEDICINE

## 2023-01-11 PROCEDURE — 99204 OFFICE O/P NEW MOD 45 MIN: CPT | Mod: S$GLB,,, | Performed by: INTERNAL MEDICINE

## 2023-01-11 PROCEDURE — 1159F MED LIST DOCD IN RCRD: CPT | Mod: CPTII,S$GLB,, | Performed by: INTERNAL MEDICINE

## 2023-01-11 PROCEDURE — 3074F PR MOST RECENT SYSTOLIC BLOOD PRESSURE < 130 MM HG: ICD-10-PCS | Mod: CPTII,S$GLB,, | Performed by: INTERNAL MEDICINE

## 2023-01-11 RX ORDER — THYROID, PORCINE 240 MG/1
240 TABLET ORAL DAILY
Qty: 30 TABLET | Refills: 11 | Status: SHIPPED | OUTPATIENT
Start: 2023-01-11 | End: 2024-01-11

## 2023-01-11 NOTE — ASSESSMENT & PLAN NOTE
this can contribute to some of the fatigue symptoms  Keep f/u with PCP, continue supplementation

## 2023-01-11 NOTE — ASSESSMENT & PLAN NOTE
Graves disease initially around 2012   treated with radioactive iodine ablation.  Now with hypothyroidism    - on high dose armour thyroid.   clinically has a few symptoms  Last TSH high, T4 low, T3 normal.   With armour, it isn't unusual for T4 to be lower compared with t3 due to that medication having comparatively more t3 than is seen in humans.   but with high TSH, dose is not strong enough   increase to 240 mg/day armour   recheck in 6-8 weeks   adjust dose further if needed based on results   goal is a normal TSH.   may need an in-between dose

## 2023-01-13 ENCOUNTER — TELEPHONE (OUTPATIENT)
Dept: ENDOCRINOLOGY | Facility: CLINIC | Age: 43
End: 2023-01-13

## 2023-01-13 NOTE — TELEPHONE ENCOUNTER
Got fax insurance doesn't want to cover Calvin thyroid unless pt tried or can't use generic levothyroxine.

## 2023-01-18 ENCOUNTER — PATIENT MESSAGE (OUTPATIENT)
Dept: ADMINISTRATIVE | Facility: HOSPITAL | Age: 43
End: 2023-01-18
Payer: COMMERCIAL

## 2023-01-19 ENCOUNTER — PATIENT MESSAGE (OUTPATIENT)
Dept: PRIMARY CARE CLINIC | Facility: CLINIC | Age: 43
End: 2023-01-19
Payer: COMMERCIAL

## 2023-01-31 DIAGNOSIS — E03.4 HYPOTHYROIDISM DUE TO ACQUIRED ATROPHY OF THYROID: ICD-10-CM

## 2023-01-31 RX ORDER — THYROID, PORCINE 180 MG/1
TABLET ORAL
Qty: 90 TABLET | Refills: 3 | OUTPATIENT
Start: 2023-01-31

## 2023-01-31 NOTE — TELEPHONE ENCOUNTER
Refill Decision Note   Katiuska Salazar  is requesting a refill authorization.  Brief Assessment and Rationale for Refill:  Quick Discontinue     Medication Therapy Plan:  discontinued on 1/11/2023 by Car Noel MD for the following reason: Alternate therapy    Medication Reconciliation Completed: No   Comments:     No Care Gaps recommended.     Note composed:4:22 AM 01/31/2023

## 2023-01-31 NOTE — TELEPHONE ENCOUNTER
No new care gaps identified.  [facility] Embedded Care Gaps. Reference number: [id number]. 1/31/2023   12:09:10 AM CST

## 2023-03-14 ENCOUNTER — PATIENT OUTREACH (OUTPATIENT)
Dept: ADMINISTRATIVE | Facility: HOSPITAL | Age: 43
End: 2023-03-14
Payer: COMMERCIAL

## 2023-03-14 ENCOUNTER — PATIENT MESSAGE (OUTPATIENT)
Dept: ADMINISTRATIVE | Facility: HOSPITAL | Age: 43
End: 2023-03-14
Payer: COMMERCIAL

## 2023-04-03 PROBLEM — Z00.01 ENCOUNTER FOR GENERAL ADULT MEDICAL EXAMINATION WITH ABNORMAL FINDINGS: Status: RESOLVED | Noted: 2022-12-27 | Resolved: 2023-04-03

## 2023-04-04 ENCOUNTER — HOSPITAL ENCOUNTER (OUTPATIENT)
Dept: RADIOLOGY | Facility: HOSPITAL | Age: 43
Discharge: HOME | End: 2023-04-04
Attending: FAMILY MEDICINE
Payer: COMMERCIAL

## 2023-04-04 DIAGNOSIS — Z12.31 OTHER SCREENING MAMMOGRAM: ICD-10-CM

## 2023-04-04 PROCEDURE — 77067 MAMMO DIGITAL SCREENING BILAT WITH TOMO: ICD-10-PCS | Mod: 26,,, | Performed by: RADIOLOGY

## 2023-04-04 PROCEDURE — 77067 SCR MAMMO BI INCL CAD: CPT | Mod: 26,,, | Performed by: RADIOLOGY

## 2023-04-04 PROCEDURE — 77063 MAMMO DIGITAL SCREENING BILAT WITH TOMO: ICD-10-PCS | Mod: 26,,, | Performed by: RADIOLOGY

## 2023-04-04 PROCEDURE — 77063 BREAST TOMOSYNTHESIS BI: CPT | Mod: 26,,, | Performed by: RADIOLOGY

## 2023-04-04 PROCEDURE — 77067 SCR MAMMO BI INCL CAD: CPT | Mod: TC

## 2023-04-05 ENCOUNTER — PATIENT MESSAGE (OUTPATIENT)
Dept: PRIMARY CARE CLINIC | Facility: CLINIC | Age: 43
End: 2023-04-05
Payer: COMMERCIAL

## 2023-04-05 DIAGNOSIS — E89.0 POSTABLATIVE HYPOTHYROIDISM: Primary | ICD-10-CM

## 2023-04-18 ENCOUNTER — PATIENT MESSAGE (OUTPATIENT)
Dept: ADMINISTRATIVE | Facility: HOSPITAL | Age: 43
End: 2023-04-18
Payer: COMMERCIAL

## 2023-09-18 ENCOUNTER — PATIENT MESSAGE (OUTPATIENT)
Dept: PRIMARY CARE CLINIC | Facility: CLINIC | Age: 43
End: 2023-09-18
Payer: COMMERCIAL

## 2023-10-18 ENCOUNTER — PATIENT MESSAGE (OUTPATIENT)
Dept: CARDIOLOGY | Facility: CLINIC | Age: 43
End: 2023-10-18
Payer: COMMERCIAL

## 2024-06-12 DIAGNOSIS — R73.03 PREDIABETES: ICD-10-CM

## 2024-06-18 PROBLEM — E61.1 IRON DEFICIENCY: Status: RESOLVED | Noted: 2022-12-27 | Resolved: 2024-06-18

## 2024-06-18 PROBLEM — E55.9 VITAMIN D DEFICIENCY: Status: RESOLVED | Noted: 2022-12-27 | Resolved: 2024-06-18

## 2024-06-18 NOTE — PROGRESS NOTES
FAMILY MEDICINE  [facility]    Reason for visit:   Chief Complaint   Patient presents with    Cyst     Collar bone         SUBJECTIVE: Katiuska Salazar is a 44 y.o. female  - with obesity, mitral regurgitation, acanthosis nigricans, pre diabetes and postablative hypothyroidism presents for concerns on a lesion on her left anterior chest    Gynecology: Dr. Tanya Welch  - no longer seeing  Endocrine Dr. Car Joshi   -no longer seeing    The patient's last visit with me was on 12/27/2022. Today she presents with concerns for a small lesion that she noticed on her left anterior chest.  She reports she was palpating over her breast area noticed lesion.  She reports it seems to be stable in size to possibly improving.  She did notice some discoloration over it initially that looks like some bruising but she does not recall any inciting trauma.  She has not noticed any other breast mass.  She had her last mammogram 04/2023 which was benign.          Review of Systems   All other systems reviewed and are negative.      HEALTH MAINTENANCE:   Health Maintenance   Topic Date Due    Mammogram  04/04/2025    TETANUS VACCINE  08/28/2025    Hepatitis C Screening  Completed    Lipid Panel  Completed     Health Maintenance Topics with due status: Not Due       Topic Last Completion Date    TETANUS VACCINE 08/28/2015    Mammogram 04/04/2023    Influenza Vaccine Not Due     Health Maintenance Due   Topic Date Due    Cervical Cancer Screening  04/28/2022    COVID-19 Vaccine (3 - 2023-24 season) 09/01/2023    Hemoglobin A1c (Prediabetes)  01/03/2024       HISTORY:   Past Medical History:   Diagnosis Date    Abnormal Pap smear 5 yrs ago    repap    Acanthosis nigricans 04/28/2015    neck    Anemia     Bronchitis 09/24/2020    Once yearly Sept-Oct, environmental, tx w Singulair, Phenergan DM, Flonase, ZYrtec & albuterol    History of blood transfusion 2006    Seasonal allergic rhinitis due to pollen  2020    Thyroid disease     hx of MARIN tx at age 22, tx Dr Magnolia Marin    Vitamin D deficiency 2022       Past Surgical History:   Procedure Laterality Date     SECTION  2015       Family History   Problem Relation Name Age of Onset    Thyroid disease Mother Estefanía Rivas     Hypertension Mother Estefanía Rivas     No Known Problems Father 63     No Known Problems Brother full     No Known Problems Brother half     No Known Problems Daughter 2     No Known Problems Son 2     Pancreatic cancer Maternal Aunt      Lung cancer Maternal Grandmother      [place] legs Neg Hx      Breast cancer Neg Hx      Ovarian cancer Neg Hx      Colon cancer Neg Hx      Cataracts Neg Hx      Glaucoma Neg Hx      Macular degeneration Neg Hx         Social History     Tobacco Use    Smoking status: Never     Passive exposure: Never    Smokeless tobacco: Never   Substance Use Topics    Alcohol use: Yes     Alcohol/week: 2.0 standard drinks of alcohol     Types: 2 Glasses of wine per week     Comment: socially 0-1 per week    Drug use: No       Social History     Social History Narrative    [employer] for [employer].  . Twins  boy & girl ( 6 yo)       ALLERGIES:   Review of patient's allergies indicates:   Allergen Reactions    Amoxicillin        MEDS:   Current Outpatient Medications on File Prior to Visit   Medication Sig Dispense Refill Last Dose    ARMOUR THYROID 240 mg Tab Take 240 mg by mouth once daily. 30 tablet 11 Taking    ferrous gluconate (FERGON) 324 MG tablet TAKE 1 TABLET BY MOUTH 2 TIMES DAILY WITH MEALS. 180 tablet 0 Taking    fluticasone propionate (FLONASE) 50 mcg/actuation nasal spray 1 spray (50 mcg total) by Each Nostril route once daily. 16 g 0 Taking    montelukast (SINGULAIR) 10 mg tablet TAKE 1 TABLET BY MOUTH EVERY DAY 90 tablet 3 Taking    naproxen (EC NAPROSYN) 500 MG EC tablet Take 1 tablet (500 mg total) by mouth 2 (two) times daily as needed (pain). 60 tablet 0  "Taking    promethazine-dextromethorphan (PROMETHAZINE-DM) 6.25-15 mg/5 mL Syrp Take 5 mLs by mouth every 4 (four) hours as needed 240 mL 0 Taking         Vital signs:   Vitals:    06/19/24 1142   BP: 111/76   Pulse: 89   SpO2: 99%   Weight: 97.3 kg (214 lb 6.4 oz)   Height: 5' 2" (1.575 m)     Body mass index is 39.21 kg/m².    PHYSICAL EXAM:     Physical Exam  Constitutional:       General: She is not in acute distress.  Cardiovascular:      Rate and Rhythm: Normal rate and regular rhythm.      Heart sounds: Normal heart sounds. No murmur heard.     No friction rub. No gallop.   Pulmonary:      Effort: Pulmonary effort is normal. No respiratory distress.      Breath sounds: Normal breath sounds. No wheezing, rhonchi or rales.   Chest:          Comments: Breasts: symmetric fibrous changes in both upper outer quadrants, abnormal mass palpable see image    Abdominal:      General: Bowel sounds are normal.      Palpations: Abdomen is soft.   Musculoskeletal:      Cervical back: Neck supple.      Right lower leg: No edema.      Left lower leg: No edema.   Neurological:      Mental Status: She is alert.   Psychiatric:         Speech: Speech normal.             PERTINENT RESULTS:   No visits with results within 1 Week(s) from this visit.   Latest known visit with results is:   Lab Visit on 04/04/2023   Component Date Value Ref Range Status    WBC 04/04/2023 6.56  3.90 - 12.70 K/uL Final    RBC 04/04/2023 5.01  4.00 - 5.40 M/uL Final    Hemoglobin 04/04/2023 10.9 (L)  12.0 - 16.0 g/dL Final    Hematocrit 04/04/2023 35.8 (L)  37.0 - 48.5 % Final    MCV 04/04/2023 72 (L)  82 - 98 fL Final    MCH 04/04/2023 21.8 (L)  27.0 - 31.0 pg Final    MCHC 04/04/2023 30.4 (L)  32.0 - 36.0 g/dL Final    RDW 04/04/2023 17.9 (H)  11.5 - 14.5 % Final    Platelets 04/04/2023 257  150 - 450 K/uL Final    MPV 04/04/2023 10.6  9.2 - 12.9 fL Final    Immature Granulocytes 04/04/2023 0.3  0.0 - 0.5 % Final    Gran # (ANC) 04/04/2023 4.5  1.8 - " 7.7 K/uL Final    Immature Grans (Abs) 04/04/2023 0.02  0.00 - 0.04 K/uL Final    Comment: Mild elevation in immature granulocytes is non specific and   can be seen in a variety of conditions including stress response,   acute inflammation, trauma and pregnancy. Correlation with other   laboratory and clinical findings is essential.      Lymph # 04/04/2023 1.5  1.0 - 4.8 K/uL Final    Mono # 04/04/2023 0.5  0.3 - 1.0 K/uL Final    Eos # 04/04/2023 0.0  0.0 - 0.5 K/uL Final    Baso # 04/04/2023 0.03  0.00 - 0.20 K/uL Final    nRBC 04/04/2023 0  0 /100 WBC Final    Gran % 04/04/2023 69.2  38.0 - 73.0 % Final    Lymph % 04/04/2023 22.1  18.0 - 48.0 % Final    Mono % 04/04/2023 7.3  4.0 - 15.0 % Final    Eosinophil % 04/04/2023 0.6  0.0 - 8.0 % Final    Basophil % 04/04/2023 0.5  0.0 - 1.9 % Final    Differential Method 04/04/2023 Automated   Final    Iron 04/04/2023 48  30 - 160 ug/dL Final    Transferrin 04/04/2023 268  200 - 375 mg/dL Final    TIBC 04/04/2023 397  250 - 450 ug/dL Final    Saturated Iron 04/04/2023 12 (L)  20 - 50 % Final    Ferritin 04/04/2023 8 (L)  20.0 - 300.0 ng/mL Final    TSH 04/04/2023 0.267 (L)  0.400 - 4.000 uIU/mL Final    Free T4 04/04/2023 0.87  0.71 - 1.51 ng/dL Final    T3, Total 04/04/2023 112  60 - 180 ng/dL Final       ASSESSMENT/PLAN:    1. Breast lump on left side at 2 o'clock position  -     Mammo Digital Diagnostic Bilat with Anthony; Future; Expected date: 06/19/2024  -     US Breast Left Limited; Future; Expected date: 06/19/2024    2. Prediabetes  Overview:  Lab Results   Component Value Date    HGBA1C 5.7 (H) 01/03/2023     - discussed recommendation for diet and cardiovascular exercise  - counseling on lifestyle modifications for risk factor reduction  - counseling on management options    Orders:  -     Hemoglobin A1C; Future; Expected date: 06/19/2024    3. Postablative hypothyroidism  Overview:  Lab Results   Component Value Date    TSH 0.267 (L) 04/04/2023    X4BCPWC 112  04/04/2023    P1SYMJS 6.2 06/08/2015    FREET4 0.87 04/04/2023     - history of Grave's s/p radioactive iodine 2012  -previously followed by endocrinology   -due for repeat TSH    Orders:  -     TSH; Future; Expected date: 06/19/2024  -     T4, Free; Future; Expected date: 06/19/2024  -     T3, Free; Future; Expected date: 06/19/2024    4. Iron deficiency anemia due to chronic blood loss  Overview:  Lab Results   Component Value Date    WBC 6.56 04/04/2023    HGB 10.9 (L) 04/04/2023    HCT 35.8 (L) 04/04/2023    MCV 72 (L) 04/04/2023     04/04/2023       Lab Results   Component Value Date    IRON 48 04/04/2023    TRANSFERRIN 268 04/04/2023    TIBC 397 04/04/2023    FESATURATED 12 (L) 04/04/2023      - asymptomatic   -monitor    Orders:  -     CBC Auto Differential; Future; Expected date: 06/19/2024  -     Ferritin; Future; Expected date: 06/19/2024  -     Iron and TIBC; Future; Expected date: 06/19/2024    5. Encounter for lipid screening for cardiovascular disease  -     Lipid Panel; Future; Expected date: 06/19/2024    6. Screening for metabolic disorder  -     Comprehensive Metabolic Panel; Future; Expected date: 06/19/2024          ORDERS:   Orders Placed This Encounter    Mammo Digital Diagnostic Bilat with Anthony    US Breast Left Limited    Lipid Panel    Hemoglobin A1C    Comprehensive Metabolic Panel    CBC Auto Differential    TSH    T4, Free    T3, Free    Ferritin    Iron and TIBC       Vaccines recommended: Covid-19    Follow up if symptoms worsen or fail to improve, for PAP and pending results. or sooner with any concerns        This note is dictated using the M*Modal Fluency Direct word recognition program. There are word recognition mistakes that are occasionally missed on review.    Dr. Pat Edmonds D.O.   Family Medicine

## 2024-06-19 ENCOUNTER — OFFICE VISIT (OUTPATIENT)
Dept: PRIMARY CARE CLINIC | Facility: CLINIC | Age: 44
End: 2024-06-19
Attending: FAMILY MEDICINE
Payer: COMMERCIAL

## 2024-06-19 VITALS
SYSTOLIC BLOOD PRESSURE: 111 MMHG | HEIGHT: 62 IN | OXYGEN SATURATION: 99 % | HEART RATE: 89 BPM | DIASTOLIC BLOOD PRESSURE: 76 MMHG | BODY MASS INDEX: 39.45 KG/M2 | WEIGHT: 214.38 LBS

## 2024-06-19 DIAGNOSIS — Z13.6 ENCOUNTER FOR LIPID SCREENING FOR CARDIOVASCULAR DISEASE: ICD-10-CM

## 2024-06-19 DIAGNOSIS — D50.0 IRON DEFICIENCY ANEMIA DUE TO CHRONIC BLOOD LOSS: ICD-10-CM

## 2024-06-19 DIAGNOSIS — Z13.220 ENCOUNTER FOR LIPID SCREENING FOR CARDIOVASCULAR DISEASE: ICD-10-CM

## 2024-06-19 DIAGNOSIS — N63.21 BREAST LUMP ON LEFT SIDE AT 2 O'CLOCK POSITION: Primary | ICD-10-CM

## 2024-06-19 DIAGNOSIS — Z13.228 SCREENING FOR METABOLIC DISORDER: ICD-10-CM

## 2024-06-19 DIAGNOSIS — E89.0 POSTABLATIVE HYPOTHYROIDISM: ICD-10-CM

## 2024-06-19 DIAGNOSIS — R73.03 PREDIABETES: ICD-10-CM

## 2024-06-19 PROCEDURE — 3078F DIAST BP <80 MM HG: CPT | Mod: CPTII,S$GLB,, | Performed by: FAMILY MEDICINE

## 2024-06-19 PROCEDURE — 1160F RVW MEDS BY RX/DR IN RCRD: CPT | Mod: CPTII,S$GLB,, | Performed by: FAMILY MEDICINE

## 2024-06-19 PROCEDURE — 99214 OFFICE O/P EST MOD 30 MIN: CPT | Mod: S$GLB,,, | Performed by: FAMILY MEDICINE

## 2024-06-19 PROCEDURE — 3008F BODY MASS INDEX DOCD: CPT | Mod: CPTII,S$GLB,, | Performed by: FAMILY MEDICINE

## 2024-06-19 PROCEDURE — 3074F SYST BP LT 130 MM HG: CPT | Mod: CPTII,S$GLB,, | Performed by: FAMILY MEDICINE

## 2024-06-19 PROCEDURE — 1159F MED LIST DOCD IN RCRD: CPT | Mod: CPTII,S$GLB,, | Performed by: FAMILY MEDICINE

## 2024-06-19 PROCEDURE — 99999 PR PBB SHADOW E&M-EST. PATIENT-LVL V: CPT | Mod: PBBFAC,,, | Performed by: FAMILY MEDICINE

## 2024-06-25 ENCOUNTER — LAB VISIT (OUTPATIENT)
Dept: LAB | Facility: HOSPITAL | Age: 44
End: 2024-06-25
Attending: FAMILY MEDICINE
Payer: COMMERCIAL

## 2024-06-25 DIAGNOSIS — E89.0 POSTABLATIVE HYPOTHYROIDISM: ICD-10-CM

## 2024-06-25 DIAGNOSIS — Z13.6 ENCOUNTER FOR LIPID SCREENING FOR CARDIOVASCULAR DISEASE: ICD-10-CM

## 2024-06-25 DIAGNOSIS — D50.0 IRON DEFICIENCY ANEMIA DUE TO CHRONIC BLOOD LOSS: ICD-10-CM

## 2024-06-25 DIAGNOSIS — Z13.228 SCREENING FOR METABOLIC DISORDER: ICD-10-CM

## 2024-06-25 DIAGNOSIS — Z13.220 ENCOUNTER FOR LIPID SCREENING FOR CARDIOVASCULAR DISEASE: ICD-10-CM

## 2024-06-25 DIAGNOSIS — R73.03 PREDIABETES: ICD-10-CM

## 2024-06-25 LAB
ALBUMIN SERPL BCP-MCNC: 3.7 G/DL (ref 3.5–5.2)
ALP SERPL-CCNC: 66 U/L (ref 55–135)
ALT SERPL W/O P-5'-P-CCNC: 11 U/L (ref 10–44)
ANION GAP SERPL CALC-SCNC: 8 MMOL/L (ref 8–16)
AST SERPL-CCNC: 15 U/L (ref 10–40)
BASOPHILS # BLD AUTO: 0.04 K/UL (ref 0–0.2)
BASOPHILS NFR BLD: 0.7 % (ref 0–1.9)
BILIRUB SERPL-MCNC: 0.4 MG/DL (ref 0.1–1)
BUN SERPL-MCNC: 13 MG/DL (ref 6–20)
CALCIUM SERPL-MCNC: 9.3 MG/DL (ref 8.7–10.5)
CHLORIDE SERPL-SCNC: 105 MMOL/L (ref 95–110)
CHOLEST SERPL-MCNC: 174 MG/DL (ref 120–199)
CHOLEST/HDLC SERPL: 3.1 {RATIO} (ref 2–5)
CO2 SERPL-SCNC: 25 MMOL/L (ref 23–29)
CREAT SERPL-MCNC: 0.8 MG/DL (ref 0.5–1.4)
DIFFERENTIAL METHOD BLD: ABNORMAL
EOSINOPHIL # BLD AUTO: 0.1 K/UL (ref 0–0.5)
EOSINOPHIL NFR BLD: 1.2 % (ref 0–8)
ERYTHROCYTE [DISTWIDTH] IN BLOOD BY AUTOMATED COUNT: 16 % (ref 11.5–14.5)
EST. GFR  (NO RACE VARIABLE): >60 ML/MIN/1.73 M^2
ESTIMATED AVG GLUCOSE: 108 MG/DL (ref 68–131)
FERRITIN SERPL-MCNC: 10 NG/ML (ref 20–300)
GLUCOSE SERPL-MCNC: 96 MG/DL (ref 70–110)
HBA1C MFR BLD: 5.4 % (ref 4–5.6)
HCT VFR BLD AUTO: 36.7 % (ref 37–48.5)
HDLC SERPL-MCNC: 56 MG/DL (ref 40–75)
HDLC SERPL: 32.2 % (ref 20–50)
HGB BLD-MCNC: 11.1 G/DL (ref 12–16)
IMM GRANULOCYTES # BLD AUTO: 0.01 K/UL (ref 0–0.04)
IMM GRANULOCYTES NFR BLD AUTO: 0.2 % (ref 0–0.5)
IRON SERPL-MCNC: 118 UG/DL (ref 30–160)
LDLC SERPL CALC-MCNC: 100.6 MG/DL (ref 63–159)
LYMPHOCYTES # BLD AUTO: 1.5 K/UL (ref 1–4.8)
LYMPHOCYTES NFR BLD: 24.5 % (ref 18–48)
MCH RBC QN AUTO: 24.5 PG (ref 27–31)
MCHC RBC AUTO-ENTMCNC: 30.2 G/DL (ref 32–36)
MCV RBC AUTO: 81 FL (ref 82–98)
MONOCYTES # BLD AUTO: 0.5 K/UL (ref 0.3–1)
MONOCYTES NFR BLD: 8 % (ref 4–15)
NEUTROPHILS # BLD AUTO: 3.9 K/UL (ref 1.8–7.7)
NEUTROPHILS NFR BLD: 65.4 % (ref 38–73)
NONHDLC SERPL-MCNC: 118 MG/DL
NRBC BLD-RTO: 0 /100 WBC
PLATELET # BLD AUTO: 246 K/UL (ref 150–450)
PMV BLD AUTO: 9.7 FL (ref 9.2–12.9)
POTASSIUM SERPL-SCNC: 4.1 MMOL/L (ref 3.5–5.1)
PROT SERPL-MCNC: 7.9 G/DL (ref 6–8.4)
RBC # BLD AUTO: 4.53 M/UL (ref 4–5.4)
SATURATED IRON: 25 % (ref 20–50)
SODIUM SERPL-SCNC: 138 MMOL/L (ref 136–145)
T3FREE SERPL-MCNC: 3 PG/ML (ref 2.3–4.2)
T4 FREE SERPL-MCNC: 0.82 NG/DL (ref 0.71–1.51)
TOTAL IRON BINDING CAPACITY: 468 UG/DL (ref 250–450)
TRANSFERRIN SERPL-MCNC: 316 MG/DL (ref 200–375)
TRIGL SERPL-MCNC: 87 MG/DL (ref 30–150)
TSH SERPL DL<=0.005 MIU/L-ACNC: 2.31 UIU/ML (ref 0.4–4)
WBC # BLD AUTO: 5.91 K/UL (ref 3.9–12.7)

## 2024-06-25 PROCEDURE — 84481 FREE ASSAY (FT-3): CPT | Performed by: FAMILY MEDICINE

## 2024-06-25 PROCEDURE — 82728 ASSAY OF FERRITIN: CPT | Performed by: FAMILY MEDICINE

## 2024-06-25 PROCEDURE — 80053 COMPREHEN METABOLIC PANEL: CPT | Performed by: FAMILY MEDICINE

## 2024-06-25 PROCEDURE — 85025 COMPLETE CBC W/AUTO DIFF WBC: CPT | Performed by: FAMILY MEDICINE

## 2024-06-25 PROCEDURE — 36415 COLL VENOUS BLD VENIPUNCTURE: CPT | Performed by: FAMILY MEDICINE

## 2024-06-25 PROCEDURE — 83540 ASSAY OF IRON: CPT | Performed by: FAMILY MEDICINE

## 2024-06-25 PROCEDURE — 84439 ASSAY OF FREE THYROXINE: CPT | Performed by: FAMILY MEDICINE

## 2024-06-25 PROCEDURE — 83036 HEMOGLOBIN GLYCOSYLATED A1C: CPT | Performed by: FAMILY MEDICINE

## 2024-06-25 PROCEDURE — 80061 LIPID PANEL: CPT | Performed by: FAMILY MEDICINE

## 2024-06-25 PROCEDURE — 84443 ASSAY THYROID STIM HORMONE: CPT | Performed by: FAMILY MEDICINE

## 2024-07-01 ENCOUNTER — PATIENT MESSAGE (OUTPATIENT)
Dept: PRIMARY CARE CLINIC | Facility: CLINIC | Age: 44
End: 2024-07-01
Payer: COMMERCIAL

## 2024-07-01 ENCOUNTER — HOSPITAL ENCOUNTER (OUTPATIENT)
Dept: RADIOLOGY | Facility: HOSPITAL | Age: 44
Discharge: HOME | End: 2024-07-01
Attending: FAMILY MEDICINE
Payer: COMMERCIAL

## 2024-07-01 DIAGNOSIS — N63.21 BREAST LUMP ON LEFT SIDE AT 2 O'CLOCK POSITION: Primary | ICD-10-CM

## 2024-07-01 DIAGNOSIS — N63.21 BREAST LUMP ON LEFT SIDE AT 2 O'CLOCK POSITION: ICD-10-CM

## 2024-07-01 DIAGNOSIS — N63.21 MASS OF UPPER OUTER QUADRANT OF LEFT BREAST: ICD-10-CM

## 2024-07-01 PROCEDURE — 77062 BREAST TOMOSYNTHESIS BI: CPT | Mod: TC

## 2024-07-01 PROCEDURE — 76642 ULTRASOUND BREAST LIMITED: CPT | Mod: 26,LT,, | Performed by: RADIOLOGY

## 2024-07-01 PROCEDURE — 76642 ULTRASOUND BREAST LIMITED: CPT | Mod: TC,LT

## 2024-07-01 PROCEDURE — 77062 BREAST TOMOSYNTHESIS BI: CPT | Mod: 26,,, | Performed by: RADIOLOGY

## 2024-07-01 PROCEDURE — 77066 DX MAMMO INCL CAD BI: CPT | Mod: 26,,, | Performed by: RADIOLOGY

## 2024-07-16 RX ORDER — MONTELUKAST SODIUM 10 MG/1
TABLET ORAL
Qty: 90 TABLET | Refills: 3 | Status: SHIPPED | OUTPATIENT
Start: 2024-07-16

## 2024-07-16 NOTE — TELEPHONE ENCOUNTER
No care due was identified.  [facility] Embedded Care Due Messages. Reference number: [id number].   7/16/2024 6:33:05 PM CDT

## 2024-07-17 RX ORDER — PROMETHAZINE HYDROCHLORIDE AND DEXTROMETHORPHAN HYDROBROMIDE 6.25; 15 MG/5ML; MG/5ML
SYRUP ORAL
Qty: 240 ML | Refills: 0 | Status: SHIPPED | OUTPATIENT
Start: 2024-07-17

## 2024-07-17 NOTE — TELEPHONE ENCOUNTER
Refill Decision Note   Katiuska Salazar  is requesting a refill authorization.  Brief Assessment and Rationale for Refill:  Approve     Medication Therapy Plan:         Comments:     Note composed:7:47 PM 07/16/2024

## 2024-07-17 NOTE — TELEPHONE ENCOUNTER
----- Message from Shanelle Patricia sent at 7/17/2024 11:56 AM CDT -----  Type:  RX Refill Request    Who Called: Pt   Refill or New Rx:Refill   RX Name and Strength:promethazine-dextromethorphan (PROMETHAZINE-DM) 6.25-15 mg/5 mL Syrp  How is the patient currently taking it? (ex. 1XDay):  Is this a 30 day or 90 day RX:  Preferred Pharmacy with phone number:[facility]/pharmacy #[id number] - [place] - [address]   Phone: [phone]  Fax: [phone]  Would the patient rather a call back or a response via [facility]? Call back   Best Call Back Number: [phone]  Additional Information:

## 2024-07-17 NOTE — TELEPHONE ENCOUNTER
Refill Encounter    PCP Visits: Recent Visits  Date Type Provider Dept   06/19/24 Office Visit Pat Edmonds DO [facility]   Showing recent visits within past 360 days and meeting all other requirements  Future Appointments  Date Type Provider Dept   10/03/24 Appointment Pat Edmonds DO [facility]   Showing future appointments within next 720 days and meeting all other requirements     Last 3 Blood Pressure:   BP Readings from Last 3 Encounters:   06/19/24 111/76   01/11/23 126/69   12/27/22 124/80     Preferred Pharmacy:   [facility]/pharmacy #[id number] - [place] - [address]  [address] [address]  Phone: [phone] Fax: [phone]    [facility]/pharmacy #[id number] - [place], [place] - [address]  [address]  [facility] [address]  Phone: [phone] Fax: [phone]    Requested RX:  Requested Prescriptions     Pending Prescriptions Disp Refills    promethazine-dextromethorphan (PROMETHAZINE-DM) 6.25-15 mg/5 mL Syrp 240 mL 0     Sig: Take 5 mLs by mouth every 4 (four) hours as needed      RX Route: Normal

## 2024-09-30 PROBLEM — N63.21 BREAST LUMP ON LEFT SIDE AT 2 O'CLOCK POSITION: Status: RESOLVED | Noted: 2024-06-19 | Resolved: 2024-09-30

## 2024-09-30 NOTE — PROGRESS NOTES
FAMILY MEDICINE  [facility]    Reason for visit:   Chief Complaint   Patient presents with    Gynecologic Exam    Annual Exam         SUBJECTIVE: Katiuska Salazar is a 44 y.o. female  - with obesity, mitral regurgitation, acanthosis nigricans, pre diabetes and postablative hypothyroidism presents for routine annual physical and Pap smear    Gynecology: Dr. Tanya Welch  - no longer seeing  Endocrine Dr. Car Joshi   -no longer seeing    Since her last visit 2024 Katiuska Salazar had a bilateral mammogram as well as left breast ultrasound.  The mass in her left breast is likely benign and a 6 month follow-up is recommended.     GYNECOLOGICAL HISTORY:She has a Paraguard (copper IUD) inserted in 2019. Her first menstrual period occurred at age 10. She has a history of one twin pregnancy. She reports recent changes in her menstrual cycle. Her previously regimented cycle has become more fluctuating, with periods lasting 5 to 7 days. Despite these changes, her cycles are still occurring regularly. She experiences occasional irritation in the labial area, often following her menstrual cycle.    PERIMENOPAUSAL SYMPTOMS:She reports experiencing perimenopausal symptoms including palpitations at night (though not recently) and episodes of overheating without sweating. She denies any other major perimenopausal symptoms.    1 PAP    Age of initial menses: 10 years old  Pregnancy history:   PAP history: no prior abnormalitis  Sexual history: monogamous relationship  LMP: IUD in place Paraguard 24    Current menstrual cycles: regular  Concerns: perimenopause cocnerns    Hormonal Medications: None    2. Hypothyroidism     Age diagnosed:     She reports a history of Graves disease that was diagnosed in .  She reports that she was initially diagnosed because she was suffering from palpitations.  She reports that she was evaluated and her thyroid levels were abnormal.   She was treated for Graves disease with radioactive iodine and since then has been on thyroid supplementation hormone.  She reports her thyroid levels have been difficult to control.  She was previously seeing a functional medicine physician Dr. Marin.  She reports that she did initially see an endocrinologist when she was 1st diagnosed but has not seen 1 in several years.  She does feel more fatigued recently.    Symptomatic at diagnosis: palpitations  Prior evaluation by Endocrinologist: Yes, describe:  When diagnosed with Graves disease  Prior thyroid US: yes - on initial diagnosis for Graves disease and denies any thyroid Nodules   Family history:  Mother with hypothyroidism    Current medication:   ARMOUR THYROID 240 mg Tab, Take 240 mg by mouth once daily., Disp: 30 tablet, Rfl: 11    Side effects from medication: deneis  Scheduled for medication: AM fasting separate from other medications    Symptoms from thyroid issue: denies fatigue, weight changes, heat/cold intolerance, bowel/skin changes or CVS symptoms  Concerns: denies    Lab Results       Component                Value               Date                       TSH                      2.311               06/25/2024                 [id number]                  112                 04/04/2023                 [id number]                  6.2                 06/08/2015                 FREET4                   0.82                06/25/2024                              Review of Systems   All other systems reviewed and are negative.      HEALTH MAINTENANCE:   Health Maintenance   Topic Date Due    TETANUS VACCINE  08/28/2025    Mammogram  07/01/2026    Hepatitis C Screening  Completed    Lipid Panel  Completed     Health Maintenance Topics with due status: Not Due       Topic Last Completion Date    TETANUS VACCINE 08/28/2015    Hemoglobin A1c (Prediabetes) 06/25/2024    Mammogram 07/01/2024    RSV Vaccine (Age 60+ and Pregnant patients) Not Due     Health Maintenance  Due   Topic Date Due    Cervical Cancer Screening  2022    Influenza Vaccine (1) Never done    COVID-19 Vaccine (3 - 2024-25 season) 2024       HISTORY:   Past Medical History:   Diagnosis Date    Abnormal Pap smear 5 yrs ago    repap    Acanthosis nigricans 2015    neck    Anemia     Bronchitis 2020    Once yearly Sept-Oct, environmental, tx w Singulair, Phenergan DM, Flonase, ZYrtec & albuterol    History of blood transfusion     Seasonal allergic rhinitis due to pollen 2020    Thyroid disease     hx of MARIN tx at age 22, tx Dr Magnolia Marin    Vitamin D deficiency 2022       Past Surgical History:   Procedure Laterality Date     SECTION  2015       Family History   Problem Relation Name Age of Onset    Thyroid disease Mother Estefanía Rivas     Hypertension Mother Estefanía Rivas     No Known Problems Father 63     No Known Problems Brother full     No Known Problems Brother half     No Known Problems Daughter 2     No Known Problems Son 2     Pancreatic cancer Maternal Aunt      Lung cancer Maternal Grandmother      [place] legs Neg Hx      Breast cancer Neg Hx      Ovarian cancer Neg Hx      Colon cancer Neg Hx      Cataracts Neg Hx      Glaucoma Neg Hx      Macular degeneration Neg Hx         Social History     Tobacco Use    Smoking status: Never     Passive exposure: Never    Smokeless tobacco: Never   Substance Use Topics    Alcohol use: Yes     Alcohol/week: 2.0 standard drinks of alcohol     Types: 2 Glasses of wine per week     Comment: socially 0-1 per week    Drug use: No       Social History     Social History Narrative    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.  . Twins  boy & girl ( 10 yo)       ALLERGIES:   Review of patient's allergies indicates:   Allergen Reactions    Amoxicillin        MEDS:   Current Outpatient Medications on File Prior to Visit   Medication Sig Dispense Refill Last Dose/Taking    ARMOUR THYROID 240 mg Tab Take 240 mg  by mouth once daily. 30 tablet 11 Taking    ferrous gluconate (FERGON) 324 MG tablet TAKE 1 TABLET BY MOUTH 2 TIMES DAILY WITH MEALS. (Patient not taking: Reported on 10/3/2024) 180 tablet 0 Not Taking    fluticasone propionate (FLONASE) 50 mcg/actuation nasal spray 1 spray (50 mcg total) by Each Nostril route once daily. (Patient not taking: Reported on 10/3/2024) 16 g 0 Not Taking    montelukast (SINGULAIR) 10 mg tablet TAKE 1 TABLET BY MOUTH EVERY DAY (Patient not taking: Reported on 10/3/2024) 90 tablet 3 Not Taking    [DISCONTINUED] naproxen (EC NAPROSYN) 500 MG EC tablet Take 1 tablet (500 mg total) by mouth 2 (two) times daily as needed (pain). (Patient not taking: Reported on 10/3/2024) 60 tablet 0 Not Taking    [DISCONTINUED] promethazine-dextromethorphan (PROMETHAZINE-DM) 6.25-15 mg/5 mL Syrp Take 5 mLs by mouth every 4 (four) hours as needed (Patient not taking: Reported on 10/3/2024) 240 mL 0 Not Taking         Vital signs:   Vitals:    10/03/24 1232   BP: 122/77   Pulse: 83   SpO2: 98%   Weight: 98.6 kg (217 lb 7.7 oz)       Body mass index is 39.78 kg/m².    PHYSICAL EXAM:     Physical Exam  Constitutional:       General: She is not in acute distress.  Pulmonary:      Effort: Pulmonary effort is normal. No respiratory distress.   Genitourinary:     Comments: Pelvic exam: normal external genitalia, vulva, vagina, cervix, uterus and adnexa, VULVA: normal appearing vulva with no masses, tenderness or lesions, VAGINA: normal appearing vagina with normal color and discharge, no lesions, CERVIX: normal appearing cervix without discharge or lesions, unable to visualize IUD string, UTERUS: uterus is normal size, shape, consistency and nontender, ADNEXA: normal adnexa in size, nontender and no masses, PAP: Pap smear done today, thin-prep method, HPV test, exam chaperoned by Dmitry Bess    Neurological:      Mental Status: She is alert.   Psychiatric:         Speech: Speech normal.             PERTINENT  RESULTS:   No visits with results within 1 Week(s) from this visit.   Latest known visit with results is:   Lab Visit on 06/25/2024   Component Date Value Ref Range Status    Cholesterol 06/25/2024 174  120 - 199 mg/dL Final    Comment: The National Cholesterol Education Program (NCEP) has set the  following guidelines (reference ranges) for Cholesterol:  Optimal.....................<200 mg/dL  Borderline High.............200-239 mg/dL  High........................> or = 240 mg/dL      Triglycerides 06/25/2024 87  30 - 150 mg/dL Final    Comment: The National Cholesterol Education Program (NCEP) has set the  following guidelines (reference values) for triglycerides:  Normal......................<150 mg/dL  Borderline High.............150-199 mg/dL  High........................200-499 mg/dL      HDL 06/25/2024 56  40 - 75 mg/dL Final    Comment: The National Cholesterol Education Program (NCEP) has set the  following guidelines (reference values) for HDL Cholesterol:  Low...............<40 mg/dL  Optimal...........>60 mg/dL      LDL Cholesterol 06/25/2024 100.6  63.0 - 159.0 mg/dL Final    Comment: The National Cholesterol Education Program (NCEP) has set the  following guidelines (reference values) for LDL Cholesterol:  Optimal.......................<130 mg/dL  Borderline High...............130-159 mg/dL  High..........................160-189 mg/dL  Very High.....................>190 mg/dL      HDL/Cholesterol Ratio 06/25/2024 32.2  20.0 - 50.0 % Final    Total Cholesterol/HDL Ratio 06/25/2024 3.1  2.0 - 5.0 Final    Non-HDL Cholesterol 06/25/2024 118  mg/dL Final    Comment: Risk category and Non-HDL cholesterol goals:  Coronary heart disease (CHD)or equivalent (10-year risk of CHD >20%):  Non-HDL cholesterol goal     <130 mg/dL  Two or more CHD risk factors and 10-year risk of CHD <= 20%:  Non-HDL cholesterol goal     <160 mg/dL  0 to 1 CHD risk factor:  Non-HDL cholesterol goal     <190 mg/dL      Hemoglobin A1C  06/25/2024 5.4  4.0 - 5.6 % Final    Comment: ADA Screening Guidelines:  5.7-6.4%  Consistent with prediabetes  >or=6.5%  Consistent with diabetes    High levels of fetal hemoglobin interfere with the HbA1C  assay. Heterozygous hemoglobin variants (HbS, HgC, etc)do  not significantly interfere with this assay.   However, presence of multiple variants may affect accuracy.      Estimated Avg Glucose 06/25/2024 108  68 - 131 mg/dL Final    Sodium 06/25/2024 138  136 - 145 mmol/L Final    Potassium 06/25/2024 4.1  3.5 - 5.1 mmol/L Final    Chloride 06/25/2024 105  95 - 110 mmol/L Final    CO2 06/25/2024 25  23 - 29 mmol/L Final    Glucose 06/25/2024 96  70 - 110 mg/dL Final    BUN 06/25/2024 13  6 - 20 mg/dL Final    Creatinine 06/25/2024 0.8  0.5 - 1.4 mg/dL Final    Calcium 06/25/2024 9.3  8.7 - 10.5 mg/dL Final    Total Protein 06/25/2024 7.9  6.0 - 8.4 g/dL Final    Albumin 06/25/2024 3.7  3.5 - 5.2 g/dL Final    Total Bilirubin 06/25/2024 0.4  0.1 - 1.0 mg/dL Final    Comment: For infants and newborns, interpretation of results should be based  on gestational age, weight and in agreement with clinical  observations.    Premature Infant recommended reference ranges:  Up to 24 hours.............<8.0 mg/dL  Up to 48 hours............<12.0 mg/dL  3-5 days..................<15.0 mg/dL  6-29 days.................<15.0 mg/dL      Alkaline Phosphatase 06/25/2024 66  55 - 135 U/L Final    AST 06/25/2024 15  10 - 40 U/L Final    ALT 06/25/2024 11  10 - 44 U/L Final    eGFR 06/25/2024 >60  >60 mL/min/1.73 m^2 Final    Anion Gap 06/25/2024 8  8 - 16 mmol/L Final    WBC 06/25/2024 5.91  3.90 - 12.70 K/uL Final    RBC 06/25/2024 4.53  4.00 - 5.40 M/uL Final    Hemoglobin 06/25/2024 11.1 (L)  12.0 - 16.0 g/dL Final    Hematocrit 06/25/2024 36.7 (L)  37.0 - 48.5 % Final    MCV 06/25/2024 81 (L)  82 - 98 fL Final    MCH 06/25/2024 24.5 (L)  27.0 - 31.0 pg Final    MCHC 06/25/2024 30.2 (L)  32.0 - 36.0 g/dL Final    RDW 06/25/2024  16.0 (H)  11.5 - 14.5 % Final    Platelets 06/25/2024 246  150 - 450 K/uL Final    MPV 06/25/2024 9.7  9.2 - 12.9 fL Final    Immature Granulocytes 06/25/2024 0.2  0.0 - 0.5 % Final    Gran # (ANC) 06/25/2024 3.9  1.8 - 7.7 K/uL Final    Immature Grans (Abs) 06/25/2024 0.01  0.00 - 0.04 K/uL Final    Comment: Mild elevation in immature granulocytes is non specific and   can be seen in a variety of conditions including stress response,   acute inflammation, trauma and pregnancy. Correlation with other   laboratory and clinical findings is essential.      Lymph # 06/25/2024 1.5  1.0 - 4.8 K/uL Final    Mono # 06/25/2024 0.5  0.3 - 1.0 K/uL Final    Eos # 06/25/2024 0.1  0.0 - 0.5 K/uL Final    Baso # 06/25/2024 0.04  0.00 - 0.20 K/uL Final    nRBC 06/25/2024 0  0 /100 WBC Final    Gran % 06/25/2024 65.4  38.0 - 73.0 % Final    Lymph % 06/25/2024 24.5  18.0 - 48.0 % Final    Mono % 06/25/2024 8.0  4.0 - 15.0 % Final    Eosinophil % 06/25/2024 1.2  0.0 - 8.0 % Final    Basophil % 06/25/2024 0.7  0.0 - 1.9 % Final    Differential Method 06/25/2024 Automated   Final    TSH 06/25/2024 2.311  0.400 - 4.000 uIU/mL Final    Free T4 06/25/2024 0.82  0.71 - 1.51 ng/dL Final    T3, Free 06/25/2024 3.0  2.3 - 4.2 pg/mL Final    Ferritin 06/25/2024 10 (L)  20.0 - 300.0 ng/mL Final    Iron 06/25/2024 118  30 - 160 ug/dL Final    Transferrin 06/25/2024 316  200 - 375 mg/dL Final    TIBC 06/25/2024 468 (H)  250 - 450 ug/dL Final    Saturated Iron 06/25/2024 25  20 - 50 % Final     Mammo Digital Diagnostic Bilat with Anthony, US Breast Left Limited  Narrative: Result:   Mammo Digital Diagnostic Bilat with Anthony  US Breast Left Limited     History:  Patient is 44 y.o. and is seen for breast lump on left side at 2 o'clock   position.    Films Compared:  Compared to: 04/04/2023 Mammo Digital Screening Bilat w/ Anthony     Findings:  This procedure was performed using tomosynthesis. Computer-aided detection   was utilized in the interpretation  of this examination.  There are scattered areas of fibroglandular density.     Mammo Digital Diagnostic Bilat with Anthony  Left  Mass: There are no corresponding masses seen on this modality.     Right  There is no evidence of suspicious masses, calcifications, or other   abnormal findings in the right breast.    US Breast Left Limited  Left  Mass: There is a 13 mm x 4 mm x 13 mm oval, parallel mass seen in the left   breast at 1 o'clock, 18 cm from the nipple. The mass correlates with the   palpable mass and skin changes reported by the patient.   Impression: Left  Mass: Short Interval Follow-Up in 6 Months is recommended.     Right  Mammo Digital Diagnostic Bilat with Anthony  There is no mammographic evidence of malignancy in the right breast.    BI-RADS Category:   Overall: 3 - Probably Benign     Recommendation:  Short interval follow-up is recommended in 6 months.    This should be an ultrasound follow up since palpable lump not visible on   mammography, possibly due to location of the palpable lump.    Your estimated lifetime risk of breast cancer (to age 85) based on   Tyrer-Cuzick risk assessment model is 9.95%.  According to the American   Cancer Society, patients with a lifetime breast cancer risk of 20% or   higher might benefit from supplemental screening tests, such as screening   breast MRI.        ASSESSMENT/PLAN:    1. Encounter for general adult medical examination with abnormal findings  Overview:  - preventative health counseling  - counseling on current recommendations for breast cancer screening.  Mammogram scheduled for 12/30/2022  - counseling on current recommendations for cervical cancer screening.  Discussed that she is overdue for Pap smear and she will transfer pelvic exam to my care.  Pap smear scheduled  - counseling on current recommendations for colon cancer screening.  Average risk recommend screening 45 years old  - Vaccines recommended at this visit:  See below      Orders:  -      Lipid Panel; Future; Expected date: 06/01/2025  -     Hemoglobin A1C; Future; Expected date: 06/01/2025  -     Comprehensive Metabolic Panel; Future; Expected date: 06/01/2025  -     CBC Auto Differential; Future; Expected date: 06/01/2025  -     T4, Free; Future; Expected date: 06/01/2025  -     TSH; Future; Expected date: 06/01/2025  -     Ferritin; Future; Expected date: 06/01/2025  -     Iron and TIBC; Future; Expected date: 06/01/2025    2. Encounter for gynecological examination without abnormal finding  -     Cancel: Liquid-Based Pap Smear, Screening  -     HPV High Risk Genotypes, PCR  -     Liquid-Based Pap Smear, Screening    3. Screening for cervical cancer  -     Cancel: Liquid-Based Pap Smear, Screening  -     HPV High Risk Genotypes, PCR  -     Liquid-Based Pap Smear, Screening    4. IUD (intrauterine device) in place-Paragard  Overview:  - 7/15/2019  - menses regular  - unable to visualized string on exam and discuss with Katiuska Salazar       5. Breast lump on left side at 2 o'clock position  Overview:  7/1/24 US left breast: Mass: There is a 13 mm x 4 mm x 13 mm oval, parallel mass seen in the left breast at 1 o'clock, 18 cm from the nipple. The mass correlates with the palpable mass and skin changes reported by the patient.   - benign and 6 month follow-up recommended    Orders:  -     US Breast Left Limited; Future; Expected date: 01/01/2025  -     Cancel: Mammo Digital Diagnostic Left with Anthony; Future; Expected date: 01/01/2025    6. Postablative hypothyroidism  Overview:  Lab Results   Component Value Date    TSH 2.311 06/25/2024    W8WOATG 112 04/04/2023    Y3VEFTB 6.2 06/08/2015    FREET4 0.82 06/25/2024     - history of Grave's s/p radioactive iodine 2012  - well controlled  - continue current management plan   - patient encouraged to notify me with any changes    Orders:  -     T4, Free; Future; Expected date: 06/01/2025  -     TSH; Future; Expected date: 06/01/2025    7.  Prediabetes  Overview:  Lab Results   Component Value Date    HGBA1C 5.4 06/25/2024     - discussed recommendation for diet and cardiovascular exercise  - counseling on lifestyle modifications for risk factor reduction  - counseling on management options  - A1C improved!    Orders:  -     Hemoglobin A1C; Future; Expected date: 06/01/2025    8. Iron deficiency anemia due to chronic blood loss  Overview:  Lab Results   Component Value Date    WBC 5.91 06/25/2024    HGB 11.1 (L) 06/25/2024    HCT 36.7 (L) 06/25/2024    MCV 81 (L) 06/25/2024     06/25/2024       Lab Results   Component Value Date    IRON 118 06/25/2024    TRANSFERRIN 316 06/25/2024    TIBC 468 (H) 06/25/2024    FESATURATED 25 06/25/2024      - asymptomatic   -monitor    Orders:  -     CBC Auto Differential; Future; Expected date: 06/01/2025  -     Ferritin; Future; Expected date: 06/01/2025  -     Iron and TIBC; Future; Expected date: 06/01/2025    9. Non-rheumatic mitral regurgitation  Overview:  - 2/2017 Echo: The mitral valve is normal in structure. The pressure half time is 71 msec. The calculated mitral valve area is 3.1 cm2. There is mild to moderate mitral regurgitation. EF 50-55%  - 7/27/18 Echo The mitral valve is normal in structure with normal leaflet mobility. There is trivial to mild mitral regurgitation. Normal EF  - asymptomatic            ORDERS:   Orders Placed This Encounter    HPV High Risk Genotypes, PCR    US Breast Left Limited    Lipid Panel    Hemoglobin A1C    Comprehensive Metabolic Panel    CBC Auto Differential    T4, Free    TSH    Ferritin    Iron and TIBC    Liquid-Based Pap Smear, Screening         Vaccines recommended: Covid-19 and flu    Follow up in about 8 months (around 6/3/2025) for Annual, Labs. or sooner with any concerns    This note is dictated using the M*Modal Fluency Direct word recognition program. There are word recognition mistakes that are occasionally missed on review.    Dr. Pat Edmonds D.O.    Family Medicine

## 2024-10-03 ENCOUNTER — OFFICE VISIT (OUTPATIENT)
Dept: PRIMARY CARE CLINIC | Facility: CLINIC | Age: 44
End: 2024-10-03
Attending: FAMILY MEDICINE
Payer: COMMERCIAL

## 2024-10-03 ENCOUNTER — PATIENT MESSAGE (OUTPATIENT)
Dept: PRIMARY CARE CLINIC | Facility: CLINIC | Age: 44
End: 2024-10-03

## 2024-10-03 VITALS
DIASTOLIC BLOOD PRESSURE: 77 MMHG | HEART RATE: 83 BPM | BODY MASS INDEX: 39.78 KG/M2 | WEIGHT: 217.5 LBS | SYSTOLIC BLOOD PRESSURE: 122 MMHG | OXYGEN SATURATION: 98 %

## 2024-10-03 DIAGNOSIS — D50.0 IRON DEFICIENCY ANEMIA DUE TO CHRONIC BLOOD LOSS: ICD-10-CM

## 2024-10-03 DIAGNOSIS — Z01.419 ENCOUNTER FOR GYNECOLOGICAL EXAMINATION WITHOUT ABNORMAL FINDING: ICD-10-CM

## 2024-10-03 DIAGNOSIS — E89.0 POSTABLATIVE HYPOTHYROIDISM: ICD-10-CM

## 2024-10-03 DIAGNOSIS — Z12.4 SCREENING FOR CERVICAL CANCER: ICD-10-CM

## 2024-10-03 DIAGNOSIS — N63.21 BREAST LUMP ON LEFT SIDE AT 2 O'CLOCK POSITION: ICD-10-CM

## 2024-10-03 DIAGNOSIS — Z00.01 ENCOUNTER FOR GENERAL ADULT MEDICAL EXAMINATION WITH ABNORMAL FINDINGS: Primary | ICD-10-CM

## 2024-10-03 DIAGNOSIS — Z97.5 IUD (INTRAUTERINE DEVICE) IN PLACE: ICD-10-CM

## 2024-10-03 DIAGNOSIS — R73.03 PREDIABETES: ICD-10-CM

## 2024-10-03 DIAGNOSIS — I34.0 NON-RHEUMATIC MITRAL REGURGITATION: ICD-10-CM

## 2024-10-03 PROCEDURE — 99999 PR PBB SHADOW E&M-EST. PATIENT-LVL IV: CPT | Mod: PBBFAC,,, | Performed by: FAMILY MEDICINE

## 2024-10-03 PROCEDURE — 87624 HPV HI-RISK TYP POOLED RSLT: CPT | Performed by: FAMILY MEDICINE

## 2024-10-03 PROCEDURE — 88175 CYTOPATH C/V AUTO FLUID REDO: CPT | Performed by: FAMILY MEDICINE

## 2024-10-04 LAB
CLINICAL INFO: NORMAL
DATE OF PREVIOUS PAP: NORMAL
DATE PREVIOUS BX: NO
LMP START DATE: NORMAL
SPECIMEN SOURCE CVX/VAG CYTO: NORMAL

## 2024-10-07 ENCOUNTER — TELEPHONE (OUTPATIENT)
Dept: PRIMARY CARE CLINIC | Facility: CLINIC | Age: 44
End: 2024-10-07
Payer: COMMERCIAL

## 2024-10-07 NOTE — TELEPHONE ENCOUNTER
Please contact patient.  I send her [employer] message which she was not reviewed.  Please let her know that she will only need a breast ultrasound for January.  She will not need the mammogram (because it was unable to see the lump that we are monitoring).  I canceled the mammogram but she was still scheduled for breast ultrasound

## 2025-04-02 ENCOUNTER — PATIENT MESSAGE (OUTPATIENT)
Dept: PRIMARY CARE CLINIC | Facility: CLINIC | Age: 45
End: 2025-04-02
Payer: COMMERCIAL

## 2025-04-02 DIAGNOSIS — E89.0 POSTABLATIVE HYPOTHYROIDISM: ICD-10-CM

## 2025-04-02 RX ORDER — THYROID 120 MG/1
120 TABLET ORAL 2 TIMES DAILY
Qty: 180 TABLET | Refills: 3 | Status: SHIPPED | OUTPATIENT
Start: 2025-04-02 | End: 2026-04-02